# Patient Record
Sex: FEMALE | Race: WHITE | NOT HISPANIC OR LATINO
[De-identification: names, ages, dates, MRNs, and addresses within clinical notes are randomized per-mention and may not be internally consistent; named-entity substitution may affect disease eponyms.]

---

## 2017-01-09 ENCOUNTER — RX RENEWAL (OUTPATIENT)
Age: 47
End: 2017-01-09

## 2017-02-17 ENCOUNTER — RX RENEWAL (OUTPATIENT)
Age: 47
End: 2017-02-17

## 2017-02-22 ENCOUNTER — RX RENEWAL (OUTPATIENT)
Age: 47
End: 2017-02-22

## 2017-02-22 ENCOUNTER — APPOINTMENT (OUTPATIENT)
Dept: OBGYN | Facility: CLINIC | Age: 47
End: 2017-02-22

## 2017-02-24 ENCOUNTER — APPOINTMENT (OUTPATIENT)
Dept: DERMATOLOGY | Facility: CLINIC | Age: 47
End: 2017-02-24

## 2017-02-24 VITALS
HEIGHT: 63 IN | DIASTOLIC BLOOD PRESSURE: 70 MMHG | BODY MASS INDEX: 43.41 KG/M2 | SYSTOLIC BLOOD PRESSURE: 128 MMHG | WEIGHT: 245 LBS

## 2017-02-24 DIAGNOSIS — Z86.69 PERSONAL HISTORY OF OTHER DISEASES OF THE NERVOUS SYSTEM AND SENSE ORGANS: ICD-10-CM

## 2017-02-24 DIAGNOSIS — Z12.83 ENCOUNTER FOR SCREENING FOR MALIGNANT NEOPLASM OF SKIN: ICD-10-CM

## 2017-02-24 DIAGNOSIS — I78.1 NEVUS, NON-NEOPLASTIC: ICD-10-CM

## 2017-02-24 DIAGNOSIS — L81.4 OTHER MELANIN HYPERPIGMENTATION: ICD-10-CM

## 2017-03-01 ENCOUNTER — APPOINTMENT (OUTPATIENT)
Dept: INTERNAL MEDICINE | Facility: CLINIC | Age: 47
End: 2017-03-01

## 2017-03-01 VITALS — SYSTOLIC BLOOD PRESSURE: 126 MMHG | DIASTOLIC BLOOD PRESSURE: 84 MMHG

## 2017-03-01 VITALS
SYSTOLIC BLOOD PRESSURE: 139 MMHG | TEMPERATURE: 98.5 F | HEART RATE: 75 BPM | DIASTOLIC BLOOD PRESSURE: 87 MMHG | RESPIRATION RATE: 12 BRPM | BODY MASS INDEX: 43.41 KG/M2 | HEIGHT: 63 IN | WEIGHT: 245 LBS | OXYGEN SATURATION: 99 %

## 2017-03-08 LAB
ADJUSTED MITOGEN: >10 IU/ML
ADJUSTED TB AG: 0 IU/ML
ALBUMIN SERPL ELPH-MCNC: 4.4 G/DL
ALP BLD-CCNC: 52 U/L
ALT SERPL-CCNC: 29 U/L
ANION GAP SERPL CALC-SCNC: 18 MMOL/L
APPEARANCE: CLEAR
AST SERPL-CCNC: 22 U/L
BASOPHILS # BLD AUTO: 0.01 K/UL
BASOPHILS NFR BLD AUTO: 0.1 %
BILIRUB SERPL-MCNC: 0.7 MG/DL
BILIRUBIN URINE: NEGATIVE
BLOOD URINE: NEGATIVE
BUN SERPL-MCNC: 13 MG/DL
CALCIUM SERPL-MCNC: 9.7 MG/DL
CHLORIDE SERPL-SCNC: 99 MMOL/L
CHOLEST SERPL-MCNC: 242 MG/DL
CHOLEST/HDLC SERPL: 4.7 RATIO
CO2 SERPL-SCNC: 23 MMOL/L
COLOR: YELLOW
CREAT SERPL-MCNC: 0.73 MG/DL
EOSINOPHIL # BLD AUTO: 0.12 K/UL
EOSINOPHIL NFR BLD AUTO: 1.4 %
GLUCOSE QUALITATIVE U: NORMAL MG/DL
GLUCOSE SERPL-MCNC: 86 MG/DL
HBA1C MFR BLD HPLC: 5.2 %
HBV SURFACE AB SER QL: NONREACTIVE
HCT VFR BLD CALC: 41.5 %
HDLC SERPL-MCNC: 52 MG/DL
HGB BLD-MCNC: 14.1 G/DL
IMM GRANULOCYTES NFR BLD AUTO: 0.2 %
KETONES URINE: NEGATIVE
LDLC SERPL CALC-MCNC: 156 MG/DL
LEUKOCYTE ESTERASE URINE: NEGATIVE
LYMPHOCYTES # BLD AUTO: 2.39 K/UL
LYMPHOCYTES NFR BLD AUTO: 26.9 %
M TB IFN-G BLD-IMP: NEGATIVE
MAN DIFF?: NORMAL
MCHC RBC-ENTMCNC: 28.7 PG
MCHC RBC-ENTMCNC: 34 GM/DL
MCV RBC AUTO: 84.3 FL
MEV IGG FLD QL IA: 258 AU/ML
MEV IGG+IGM SER-IMP: POSITIVE
MONOCYTES # BLD AUTO: 0.44 K/UL
MONOCYTES NFR BLD AUTO: 5 %
NEUTROPHILS # BLD AUTO: 5.89 K/UL
NEUTROPHILS NFR BLD AUTO: 66.4 %
NITRITE URINE: NEGATIVE
PH URINE: 6
PLATELET # BLD AUTO: 317 K/UL
POTASSIUM SERPL-SCNC: 3.9 MMOL/L
PROT SERPL-MCNC: 7.5 G/DL
PROTEIN URINE: NEGATIVE MG/DL
QUANTIFERON GOLD NIL: 0.03 IU/ML
RBC # BLD: 4.92 M/UL
RBC # FLD: 14.2 %
RUBV IGG FLD-ACNC: 3.2 INDEX
RUBV IGG SER-IMP: POSITIVE
SODIUM SERPL-SCNC: 140 MMOL/L
SPECIFIC GRAVITY URINE: 1.02
TRIGL SERPL-MCNC: 169 MG/DL
TSH SERPL-ACNC: 1.25 UIU/ML
UROBILINOGEN URINE: NORMAL MG/DL
VIT B12 SERPL-MCNC: 582 PG/ML
VZV AB TITR SER: POSITIVE
VZV IGG SER IF-ACNC: 463.8 INDEX
WBC # FLD AUTO: 8.87 K/UL

## 2017-03-09 LAB — 25(OH)D3 SERPL-MCNC: 25.8 NG/ML

## 2017-03-16 ENCOUNTER — RX RENEWAL (OUTPATIENT)
Age: 47
End: 2017-03-16

## 2017-05-01 ENCOUNTER — RX RENEWAL (OUTPATIENT)
Age: 47
End: 2017-05-01

## 2017-05-25 ENCOUNTER — APPOINTMENT (OUTPATIENT)
Dept: ORTHOPEDIC SURGERY | Facility: CLINIC | Age: 47
End: 2017-05-25

## 2017-05-25 VITALS
SYSTOLIC BLOOD PRESSURE: 140 MMHG | BODY MASS INDEX: 43.41 KG/M2 | DIASTOLIC BLOOD PRESSURE: 87 MMHG | WEIGHT: 245 LBS | HEIGHT: 63 IN

## 2017-05-25 DIAGNOSIS — M47.816 SPONDYLOSIS W/OUT MYELOPATHY OR RADICULOPATHY, LUMBAR REGION: ICD-10-CM

## 2017-08-25 ENCOUNTER — RX RENEWAL (OUTPATIENT)
Age: 47
End: 2017-08-25

## 2017-09-18 ENCOUNTER — APPOINTMENT (OUTPATIENT)
Dept: INTERNAL MEDICINE | Facility: CLINIC | Age: 47
End: 2017-09-18
Payer: MEDICAID

## 2017-09-18 VITALS
HEIGHT: 63 IN | HEART RATE: 73 BPM | WEIGHT: 215 LBS | SYSTOLIC BLOOD PRESSURE: 104 MMHG | RESPIRATION RATE: 12 BRPM | BODY MASS INDEX: 38.09 KG/M2 | OXYGEN SATURATION: 98 % | TEMPERATURE: 98.1 F | DIASTOLIC BLOOD PRESSURE: 70 MMHG

## 2017-09-18 DIAGNOSIS — L65.9 NONSCARRING HAIR LOSS, UNSPECIFIED: ICD-10-CM

## 2017-09-18 PROCEDURE — 99214 OFFICE O/P EST MOD 30 MIN: CPT

## 2017-09-20 LAB — TSH SERPL-ACNC: 1.85 UIU/ML

## 2017-09-22 LAB
ANA PAT FLD IF-IMP: ABNORMAL
ANA SER IF-ACNC: ABNORMAL
ESTROGEN SERPL-MCNC: 97 PG/ML

## 2017-09-24 LAB
TESTOST BND SERPL-MCNC: 0.4 PG/ML
TESTOST SERPL-MCNC: 22.4 NG/DL

## 2017-09-25 DIAGNOSIS — R76.8 OTHER SPECIFIED ABNORMAL IMMUNOLOGICAL FINDINGS IN SERUM: ICD-10-CM

## 2017-09-27 PROBLEM — R76.8 ELEVATED ANTINUCLEAR ANTIBODY (ANA) LEVEL: Status: ACTIVE | Noted: 2017-09-27

## 2017-09-27 LAB
BASOPHILS # BLD AUTO: 0.02 K/UL
BASOPHILS NFR BLD AUTO: 0.2 %
EOSINOPHIL # BLD AUTO: 0.1 K/UL
EOSINOPHIL NFR BLD AUTO: 1.2 %
HCT VFR BLD CALC: 41.8 %
HGB BLD-MCNC: 13.6 G/DL
IMM GRANULOCYTES NFR BLD AUTO: 0.2 %
LYMPHOCYTES # BLD AUTO: 2.43 K/UL
LYMPHOCYTES NFR BLD AUTO: 28 %
MAN DIFF?: NORMAL
MCHC RBC-ENTMCNC: 29.2 PG
MCHC RBC-ENTMCNC: 32.5 GM/DL
MCV RBC AUTO: 89.7 FL
MONOCYTES # BLD AUTO: 0.55 K/UL
MONOCYTES NFR BLD AUTO: 6.3 %
NEUTROPHILS # BLD AUTO: 5.57 K/UL
NEUTROPHILS NFR BLD AUTO: 64.1 %
PLATELET # BLD AUTO: 285 K/UL
RBC # BLD: 4.66 M/UL
RBC # FLD: 14.2 %
WBC # FLD AUTO: 8.69 K/UL

## 2017-10-10 ENCOUNTER — TRANSCRIPTION ENCOUNTER (OUTPATIENT)
Age: 47
End: 2017-10-10

## 2017-10-30 ENCOUNTER — APPOINTMENT (OUTPATIENT)
Dept: INTERNAL MEDICINE | Facility: CLINIC | Age: 47
End: 2017-10-30

## 2017-11-02 ENCOUNTER — APPOINTMENT (OUTPATIENT)
Dept: RHEUMATOLOGY | Facility: CLINIC | Age: 47
End: 2017-11-02

## 2017-11-09 ENCOUNTER — APPOINTMENT (OUTPATIENT)
Dept: DERMATOLOGY | Facility: CLINIC | Age: 47
End: 2017-11-09

## 2018-03-09 ENCOUNTER — RX RENEWAL (OUTPATIENT)
Age: 48
End: 2018-03-09

## 2018-03-19 ENCOUNTER — APPOINTMENT (OUTPATIENT)
Dept: OBGYN | Facility: CLINIC | Age: 48
End: 2018-03-19
Payer: COMMERCIAL

## 2018-03-19 VITALS
WEIGHT: 207.6 LBS | BODY MASS INDEX: 36.79 KG/M2 | HEART RATE: 61 BPM | HEIGHT: 63 IN | SYSTOLIC BLOOD PRESSURE: 133 MMHG | DIASTOLIC BLOOD PRESSURE: 86 MMHG

## 2018-03-19 PROCEDURE — 99386 PREV VISIT NEW AGE 40-64: CPT

## 2018-03-20 LAB — HPV HIGH+LOW RISK DNA PNL CVX: NOT DETECTED

## 2018-03-26 ENCOUNTER — MEDICATION RENEWAL (OUTPATIENT)
Age: 48
End: 2018-03-26

## 2018-03-27 ENCOUNTER — MEDICATION RENEWAL (OUTPATIENT)
Age: 48
End: 2018-03-27

## 2018-04-01 LAB — CYTOLOGY CVX/VAG DOC THIN PREP: NORMAL

## 2018-05-18 ENCOUNTER — RX RENEWAL (OUTPATIENT)
Age: 48
End: 2018-05-18

## 2018-06-18 ENCOUNTER — APPOINTMENT (OUTPATIENT)
Dept: INTERNAL MEDICINE | Facility: CLINIC | Age: 48
End: 2018-06-18

## 2018-06-27 ENCOUNTER — RX RENEWAL (OUTPATIENT)
Age: 48
End: 2018-06-27

## 2018-08-30 ENCOUNTER — RX RENEWAL (OUTPATIENT)
Age: 48
End: 2018-08-30

## 2018-09-11 ENCOUNTER — APPOINTMENT (OUTPATIENT)
Dept: INTERNAL MEDICINE | Facility: CLINIC | Age: 48
End: 2018-09-11

## 2018-10-01 ENCOUNTER — APPOINTMENT (OUTPATIENT)
Dept: INTERNAL MEDICINE | Facility: CLINIC | Age: 48
End: 2018-10-01
Payer: COMMERCIAL

## 2018-10-01 ENCOUNTER — LABORATORY RESULT (OUTPATIENT)
Age: 48
End: 2018-10-01

## 2018-10-01 VITALS
BODY MASS INDEX: 36.32 KG/M2 | WEIGHT: 205 LBS | SYSTOLIC BLOOD PRESSURE: 137 MMHG | RESPIRATION RATE: 12 BRPM | OXYGEN SATURATION: 98 % | HEART RATE: 64 BPM | TEMPERATURE: 98.1 F | DIASTOLIC BLOOD PRESSURE: 82 MMHG | HEIGHT: 63 IN

## 2018-10-01 PROCEDURE — 99396 PREV VISIT EST AGE 40-64: CPT

## 2018-10-01 NOTE — HEALTH RISK ASSESSMENT
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

## 2018-10-01 NOTE — ASSESSMENT
[FreeTextEntry1] : Physical\par blood work ordered\par Had flu vaccine at work\par referred for mammography\par She is UTD with her PAP\par \par Hx of  HTN \par cont current meds\par \par Hx of hypercholesterolemia\par cont low chol/low fat diet\par \par f/u in one week for lab results\par

## 2018-10-02 LAB
25(OH)D3 SERPL-MCNC: 30.1 NG/ML
ALBUMIN SERPL ELPH-MCNC: 4.4 G/DL
ALP BLD-CCNC: 41 U/L
ALT SERPL-CCNC: 15 U/L
ANION GAP SERPL CALC-SCNC: 16 MMOL/L
AST SERPL-CCNC: 17 U/L
BASOPHILS # BLD AUTO: 0.02 K/UL
BASOPHILS NFR BLD AUTO: 0.2 %
BILIRUB SERPL-MCNC: 0.5 MG/DL
BUN SERPL-MCNC: 17 MG/DL
CALCIUM SERPL-MCNC: 9.7 MG/DL
CHLORIDE SERPL-SCNC: 102 MMOL/L
CHOLEST SERPL-MCNC: 200 MG/DL
CHOLEST/HDLC SERPL: 3.3 RATIO
CO2 SERPL-SCNC: 24 MMOL/L
CREAT SERPL-MCNC: 0.76 MG/DL
EOSINOPHIL # BLD AUTO: 0.16 K/UL
EOSINOPHIL NFR BLD AUTO: 2 %
FERRITIN SERPL-MCNC: 125 NG/ML
GLUCOSE SERPL-MCNC: 82 MG/DL
HBA1C MFR BLD HPLC: 5 %
HCT VFR BLD CALC: 42.9 %
HDLC SERPL-MCNC: 60 MG/DL
HGB BLD-MCNC: 14.6 G/DL
IMM GRANULOCYTES NFR BLD AUTO: 0.1 %
IRON SATN MFR SERPL: 29 %
IRON SERPL-MCNC: 117 UG/DL
LDLC SERPL CALC-MCNC: 108 MG/DL
LYMPHOCYTES # BLD AUTO: 2.67 K/UL
LYMPHOCYTES NFR BLD AUTO: 32.8 %
MAN DIFF?: NORMAL
MCHC RBC-ENTMCNC: 30.4 PG
MCHC RBC-ENTMCNC: 34 GM/DL
MCV RBC AUTO: 89.4 FL
MONOCYTES # BLD AUTO: 0.49 K/UL
MONOCYTES NFR BLD AUTO: 6 %
NEUTROPHILS # BLD AUTO: 4.8 K/UL
NEUTROPHILS NFR BLD AUTO: 58.9 %
PLATELET # BLD AUTO: 224 K/UL
POTASSIUM SERPL-SCNC: 4.4 MMOL/L
PROT SERPL-MCNC: 7.4 G/DL
RBC # BLD: 4.8 M/UL
RBC # FLD: 13.7 %
SODIUM SERPL-SCNC: 141 MMOL/L
TIBC SERPL-MCNC: 398 UG/DL
TRIGL SERPL-MCNC: 159 MG/DL
TSH SERPL-ACNC: 1.42 UIU/ML
UIBC SERPL-MCNC: 281 UG/DL
VIT B12 SERPL-MCNC: 1004 PG/ML
WBC # FLD AUTO: 8.15 K/UL

## 2018-10-10 LAB
APPEARANCE: CLEAR
BILIRUBIN URINE: NEGATIVE
BLOOD URINE: NEGATIVE
COLOR: YELLOW
GLUCOSE QUALITATIVE U: NEGATIVE MG/DL
KETONES URINE: NEGATIVE
LEUKOCYTE ESTERASE URINE: ABNORMAL
NITRITE URINE: NEGATIVE
PH URINE: 5
PROTEIN URINE: NEGATIVE MG/DL
SPECIFIC GRAVITY URINE: 1.02
UROBILINOGEN URINE: NEGATIVE MG/DL

## 2018-10-25 LAB — BACTERIA UR CULT: NORMAL

## 2018-10-29 ENCOUNTER — MEDICATION RENEWAL (OUTPATIENT)
Age: 48
End: 2018-10-29

## 2018-11-01 LAB
APPEARANCE: CLEAR
BACTERIA: ABNORMAL
BILIRUBIN URINE: NEGATIVE
BLOOD URINE: NEGATIVE
COLOR: YELLOW
GLUCOSE QUALITATIVE U: NEGATIVE MG/DL
HYALINE CASTS: 3 /LPF
KETONES URINE: NEGATIVE
LEUKOCYTE ESTERASE URINE: ABNORMAL
MICROSCOPIC-UA: NORMAL
NITRITE URINE: NEGATIVE
PH URINE: 5.5
PROTEIN URINE: NEGATIVE MG/DL
RED BLOOD CELLS URINE: 8 /HPF
SPECIFIC GRAVITY URINE: 1.03
SQUAMOUS EPITHELIAL CELLS: 8 /HPF
UROBILINOGEN URINE: NEGATIVE MG/DL
WHITE BLOOD CELLS URINE: 13 /HPF

## 2018-11-05 ENCOUNTER — APPOINTMENT (OUTPATIENT)
Dept: UROLOGY | Facility: CLINIC | Age: 48
End: 2018-11-05
Payer: COMMERCIAL

## 2018-12-10 ENCOUNTER — APPOINTMENT (OUTPATIENT)
Dept: UROLOGY | Facility: CLINIC | Age: 48
End: 2018-12-10
Payer: COMMERCIAL

## 2018-12-10 VITALS
DIASTOLIC BLOOD PRESSURE: 84 MMHG | HEIGHT: 63 IN | RESPIRATION RATE: 12 BRPM | WEIGHT: 208 LBS | SYSTOLIC BLOOD PRESSURE: 127 MMHG | OXYGEN SATURATION: 98 % | BODY MASS INDEX: 36.86 KG/M2 | HEART RATE: 69 BPM

## 2018-12-10 DIAGNOSIS — I34.1 NONRHEUMATIC MITRAL (VALVE) PROLAPSE: ICD-10-CM

## 2018-12-10 PROCEDURE — 99204 OFFICE O/P NEW MOD 45 MIN: CPT

## 2019-01-16 ENCOUNTER — APPOINTMENT (OUTPATIENT)
Dept: UROLOGY | Facility: CLINIC | Age: 49
End: 2019-01-16

## 2019-01-17 ENCOUNTER — OUTPATIENT (OUTPATIENT)
Dept: OUTPATIENT SERVICES | Facility: HOSPITAL | Age: 49
LOS: 1 days | End: 2019-01-17
Payer: COMMERCIAL

## 2019-01-17 ENCOUNTER — APPOINTMENT (OUTPATIENT)
Dept: UROLOGY | Facility: CLINIC | Age: 49
End: 2019-01-17
Payer: COMMERCIAL

## 2019-01-17 VITALS
TEMPERATURE: 98 F | RESPIRATION RATE: 16 BRPM | HEART RATE: 72 BPM | SYSTOLIC BLOOD PRESSURE: 119 MMHG | DIASTOLIC BLOOD PRESSURE: 78 MMHG

## 2019-01-17 DIAGNOSIS — R82.90 UNSPECIFIED ABNORMAL FINDINGS IN URINE: ICD-10-CM

## 2019-01-17 PROCEDURE — 52000 CYSTOURETHROSCOPY: CPT

## 2019-01-18 ENCOUNTER — APPOINTMENT (OUTPATIENT)
Dept: UROLOGY | Facility: CLINIC | Age: 49
End: 2019-01-18

## 2019-01-18 DIAGNOSIS — R31.21 ASYMPTOMATIC MICROSCOPIC HEMATURIA: ICD-10-CM

## 2019-01-18 DIAGNOSIS — R82.90 UNSPECIFIED ABNORMAL FINDINGS IN URINE: ICD-10-CM

## 2019-02-25 ENCOUNTER — APPOINTMENT (OUTPATIENT)
Dept: INTERNAL MEDICINE | Facility: CLINIC | Age: 49
End: 2019-02-25

## 2019-03-04 ENCOUNTER — APPOINTMENT (OUTPATIENT)
Dept: OPHTHALMOLOGY | Facility: CLINIC | Age: 49
End: 2019-03-04
Payer: COMMERCIAL

## 2019-03-04 PROCEDURE — 92004 COMPRE OPH EXAM NEW PT 1/>: CPT

## 2019-03-04 PROCEDURE — 92133 CPTRZD OPH DX IMG PST SGM ON: CPT

## 2019-03-18 ENCOUNTER — APPOINTMENT (OUTPATIENT)
Dept: INTERNAL MEDICINE | Facility: CLINIC | Age: 49
End: 2019-03-18

## 2019-03-20 ENCOUNTER — APPOINTMENT (OUTPATIENT)
Dept: INTERNAL MEDICINE | Facility: CLINIC | Age: 49
End: 2019-03-20
Payer: COMMERCIAL

## 2019-03-20 VITALS
WEIGHT: 214 LBS | BODY MASS INDEX: 37.92 KG/M2 | HEIGHT: 63 IN | RESPIRATION RATE: 12 BRPM | TEMPERATURE: 98.4 F | OXYGEN SATURATION: 97 % | HEART RATE: 69 BPM | DIASTOLIC BLOOD PRESSURE: 83 MMHG | SYSTOLIC BLOOD PRESSURE: 119 MMHG

## 2019-03-20 DIAGNOSIS — M62.838 OTHER MUSCLE SPASM: ICD-10-CM

## 2019-03-20 DIAGNOSIS — S16.1XXA STRAIN OF MUSCLE, FASCIA AND TENDON AT NECK LEVEL, INITIAL ENCOUNTER: ICD-10-CM

## 2019-03-20 PROCEDURE — 99214 OFFICE O/P EST MOD 30 MIN: CPT

## 2019-03-20 NOTE — HISTORY OF PRESENT ILLNESS
[FreeTextEntry8] : 47 yo female, presents complaining of pain on the right side of her neck upper shoulder area. Pain has been on and off for a couple of months. Yesterday while at work she got the pain in the morning and as the day progressed the pain got worse and she could not move her neck. She took ibuprofen and  put a heating pad but it did not help. Pain goes to the right shoulder. denies numbness/tingling to the upper extremities. This morning she says the pain is better and she is able to move her head

## 2019-03-20 NOTE — PHYSICAL EXAM
[No Acute Distress] : no acute distress [Well Nourished] : well nourished [Well Developed] : well developed [Well-Appearing] : well-appearing [Normal Sclera/Conjunctiva] : normal sclera/conjunctiva [Normal Outer Ear/Nose] : the outer ears and nose were normal in appearance [No JVD] : no jugular venous distention [Clear to Auscultation] : lungs were clear to auscultation bilaterally [No Respiratory Distress] : no respiratory distress  [Regular Rhythm] : with a regular rhythm [Normal Rate] : normal rate  [Normal S1, S2] : normal S1 and S2 [No Edema] : there was no peripheral edema [Non-distended] : non-distended [Non Tender] : non-tender [Soft] : abdomen soft [No CVA Tenderness] : no CVA  tenderness [No Rash] : no rash [No Focal Deficits] : no focal deficits [Normal Gait] : normal gait [Normal Insight/Judgement] : insight and judgment were intact [de-identified] : + mild rt paracervical and rt upper trapezius muscle tenderness and spasm. ROM are essentially within normalits at the C-spine

## 2019-03-25 ENCOUNTER — APPOINTMENT (OUTPATIENT)
Dept: OPHTHALMOLOGY | Facility: CLINIC | Age: 49
End: 2019-03-25
Payer: COMMERCIAL

## 2019-03-25 PROCEDURE — 92082 INTERMEDIATE VISUAL FIELD XM: CPT

## 2019-03-25 PROCEDURE — 92012 INTRM OPH EXAM EST PATIENT: CPT

## 2019-07-15 ENCOUNTER — APPOINTMENT (OUTPATIENT)
Dept: UROLOGY | Facility: CLINIC | Age: 49
End: 2019-07-15

## 2019-09-04 RX ORDER — SUMATRIPTAN AND NAPROXEN SODIUM 85; 500 MG/1; MG/1
85-500 TABLET, FILM COATED ORAL
Qty: 30 | Refills: 0 | Status: DISCONTINUED | COMMUNITY
Start: 2018-06-27 | End: 2019-09-04

## 2019-09-30 ENCOUNTER — APPOINTMENT (OUTPATIENT)
Dept: OPHTHALMOLOGY | Facility: CLINIC | Age: 49
End: 2019-09-30

## 2019-11-03 ENCOUNTER — TRANSCRIPTION ENCOUNTER (OUTPATIENT)
Age: 49
End: 2019-11-03

## 2020-01-22 ENCOUNTER — APPOINTMENT (OUTPATIENT)
Dept: INTERNAL MEDICINE | Facility: CLINIC | Age: 50
End: 2020-01-22
Payer: MEDICAID

## 2020-01-22 VITALS
DIASTOLIC BLOOD PRESSURE: 87 MMHG | HEART RATE: 66 BPM | WEIGHT: 235 LBS | SYSTOLIC BLOOD PRESSURE: 132 MMHG | RESPIRATION RATE: 13 BRPM | HEIGHT: 63 IN | TEMPERATURE: 98.7 F | BODY MASS INDEX: 41.64 KG/M2 | OXYGEN SATURATION: 96 %

## 2020-01-22 PROCEDURE — 99396 PREV VISIT EST AGE 40-64: CPT | Mod: 25

## 2020-01-22 PROCEDURE — 99213 OFFICE O/P EST LOW 20 MIN: CPT

## 2020-01-22 NOTE — ASSESSMENT
[FreeTextEntry1] : Physical\par Has linda't for colonoscopy next month ( Feb 14th, 2020)\par referred for mammography \par pt to schedule linda't with her gyn\par Had flu vaccine at work\par blood work ordered\par \par  Hx of HTN, hypercholesterolemia\par cont current meds\par \par dependent edema\par referred to vascular\par \par follow up in one week for lab results\par

## 2020-01-22 NOTE — PHYSICAL EXAM
[No Acute Distress] : no acute distress [Well-Appearing] : well-appearing [Normal Sclera/Conjunctiva] : normal sclera/conjunctiva [PERRL] : pupils equal round and reactive to light [EOMI] : extraocular movements intact [Normal Outer Ear/Nose] : the outer ears and nose were normal in appearance [Normal Oropharynx] : the oropharynx was normal [No JVD] : no jugular venous distention [No Lymphadenopathy] : no lymphadenopathy [Thyroid Normal, No Nodules] : the thyroid was normal and there were no nodules present [Supple] : supple [No Respiratory Distress] : no respiratory distress  [No Accessory Muscle Use] : no accessory muscle use [Normal Rate] : normal rate  [Clear to Auscultation] : lungs were clear to auscultation bilaterally [Regular Rhythm] : with a regular rhythm [Normal S1, S2] : normal S1 and S2 [No Murmur] : no murmur heard [Soft] : abdomen soft [No Extremity Clubbing/Cyanosis] : no extremity clubbing/cyanosis [Non-distended] : non-distended [Non Tender] : non-tender [No Masses] : no abdominal mass palpated [No HSM] : no HSM [Normal Anterior Cervical Nodes] : no anterior cervical lymphadenopathy [Normal Bowel Sounds] : normal bowel sounds [Normal Posterior Cervical Nodes] : no posterior cervical lymphadenopathy [No Spinal Tenderness] : no spinal tenderness [No CVA Tenderness] : no CVA  tenderness [No Joint Swelling] : no joint swelling [Grossly Normal Strength/Tone] : grossly normal strength/tone [No Rash] : no rash [No Focal Deficits] : no focal deficits [Coordination Grossly Intact] : coordination grossly intact [Normal Gait] : normal gait [Normal Affect] : the affect was normal [Alert and Oriented x3] : oriented to person, place, and time [Normal Insight/Judgement] : insight and judgment were intact [de-identified] : obese female [de-identified] : + dependent edema b/l LE with early venous stasis skin changes noted

## 2020-01-22 NOTE — COUNSELING
[Benefits of weight loss discussed] : Benefits of weight loss discussed [Encouraged to increase physical activity] : Encouraged to increase physical activity [FreeTextEntry2] : pt states she has started weight watchers

## 2020-01-22 NOTE — HISTORY OF PRESENT ILLNESS
[de-identified] : Ms. KAMARI PASTOR is a 49 year old female, with Hx of HTN, hypercholesterolemia, presents for annual physical\par She doing well, offers no complaints\par

## 2020-01-22 NOTE — HEALTH RISK ASSESSMENT
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

## 2020-01-30 ENCOUNTER — APPOINTMENT (OUTPATIENT)
Dept: OPHTHALMOLOGY | Facility: CLINIC | Age: 50
End: 2020-01-30
Payer: COMMERCIAL

## 2020-01-30 ENCOUNTER — NON-APPOINTMENT (OUTPATIENT)
Age: 50
End: 2020-01-30

## 2020-01-30 LAB
25(OH)D3 SERPL-MCNC: 25.8 NG/ML
ALBUMIN SERPL ELPH-MCNC: 4.7 G/DL
ALP BLD-CCNC: 51 U/L
ALT SERPL-CCNC: 19 U/L
ANION GAP SERPL CALC-SCNC: 17 MMOL/L
APPEARANCE: CLEAR
AST SERPL-CCNC: 20 U/L
BASOPHILS # BLD AUTO: 0.03 K/UL
BASOPHILS NFR BLD AUTO: 0.5 %
BILIRUB SERPL-MCNC: 0.6 MG/DL
BILIRUBIN URINE: NEGATIVE
BLOOD URINE: NEGATIVE
BUN SERPL-MCNC: 14 MG/DL
CALCIUM SERPL-MCNC: 10 MG/DL
CHLORIDE SERPL-SCNC: 98 MMOL/L
CHOLEST SERPL-MCNC: 246 MG/DL
CHOLEST/HDLC SERPL: 3.7 RATIO
CO2 SERPL-SCNC: 25 MMOL/L
COLOR: NORMAL
CREAT SERPL-MCNC: 0.65 MG/DL
EOSINOPHIL # BLD AUTO: 0.12 K/UL
EOSINOPHIL NFR BLD AUTO: 1.9 %
ESTIMATED AVERAGE GLUCOSE: 88 MG/DL
GLUCOSE QUALITATIVE U: NEGATIVE
GLUCOSE SERPL-MCNC: 75 MG/DL
HBA1C MFR BLD HPLC: 4.7 %
HCT VFR BLD CALC: 45.8 %
HDLC SERPL-MCNC: 66 MG/DL
HGB BLD-MCNC: 14.9 G/DL
IMM GRANULOCYTES NFR BLD AUTO: 0.2 %
KETONES URINE: NEGATIVE
LDLC SERPL CALC-MCNC: 150 MG/DL
LEUKOCYTE ESTERASE URINE: NEGATIVE
LYMPHOCYTES # BLD AUTO: 2.27 K/UL
LYMPHOCYTES NFR BLD AUTO: 36.8 %
MAN DIFF?: NORMAL
MCHC RBC-ENTMCNC: 29.4 PG
MCHC RBC-ENTMCNC: 32.5 GM/DL
MCV RBC AUTO: 90.3 FL
MONOCYTES # BLD AUTO: 0.4 K/UL
MONOCYTES NFR BLD AUTO: 6.5 %
NEUTROPHILS # BLD AUTO: 3.34 K/UL
NEUTROPHILS NFR BLD AUTO: 54.1 %
NITRITE URINE: NEGATIVE
PH URINE: 6.5
PLATELET # BLD AUTO: 250 K/UL
POTASSIUM SERPL-SCNC: 4.1 MMOL/L
PROT SERPL-MCNC: 7.5 G/DL
PROTEIN URINE: NEGATIVE
RBC # BLD: 5.07 M/UL
RBC # FLD: 13.9 %
SODIUM SERPL-SCNC: 140 MMOL/L
SPECIFIC GRAVITY URINE: 1.01
TRIGL SERPL-MCNC: 148 MG/DL
TSH SERPL-ACNC: 1.34 UIU/ML
UROBILINOGEN URINE: NORMAL
VIT B12 SERPL-MCNC: 555 PG/ML
WBC # FLD AUTO: 6.17 K/UL

## 2020-01-30 PROCEDURE — 92134 CPTRZ OPH DX IMG PST SGM RTA: CPT

## 2020-01-30 PROCEDURE — 92014 COMPRE OPH EXAM EST PT 1/>: CPT

## 2020-02-21 ENCOUNTER — APPOINTMENT (OUTPATIENT)
Dept: CARDIOLOGY | Facility: CLINIC | Age: 50
End: 2020-02-21

## 2020-02-27 ENCOUNTER — RX RENEWAL (OUTPATIENT)
Age: 50
End: 2020-02-27

## 2020-03-09 ENCOUNTER — APPOINTMENT (OUTPATIENT)
Dept: SURGERY | Facility: CLINIC | Age: 50
End: 2020-03-09
Payer: COMMERCIAL

## 2020-03-09 VITALS
SYSTOLIC BLOOD PRESSURE: 145 MMHG | WEIGHT: 234 LBS | RESPIRATION RATE: 16 BRPM | BODY MASS INDEX: 43.06 KG/M2 | TEMPERATURE: 97.9 F | OXYGEN SATURATION: 98 % | HEIGHT: 62 IN | HEART RATE: 73 BPM | DIASTOLIC BLOOD PRESSURE: 86 MMHG

## 2020-03-09 DIAGNOSIS — Z56.0 UNEMPLOYMENT, UNSPECIFIED: ICD-10-CM

## 2020-03-09 PROCEDURE — 99204 OFFICE O/P NEW MOD 45 MIN: CPT

## 2020-03-09 SDOH — ECONOMIC STABILITY - INCOME SECURITY: UNEMPLOYMENT, UNSPECIFIED: Z56.0

## 2020-03-09 NOTE — PHYSICAL EXAM
[Obese, well nourished, in no acute distress] : obese, well nourished, in no acute distress [Normal] : affect appropriate [de-identified] : normal respirations and chest expansion  [de-identified] : soft, nontender, nondistended

## 2020-03-09 NOTE — HISTORY OF PRESENT ILLNESS
[de-identified] :  KAMARI PASTOR is a 49 year old female here for consultation for weight loss surgery. \par \par The patient has been struggling with obesity for her whole life.  She has attempted several diet and exercise programs without prolonged success.  She was most recently on weight watchers, losing over 50 pounds, however has started to regain the weight.  The excess weight is starting to significantly affect her health and lifestyle.\par \par Medical history is significant for hypertension and lower extremity edema.\par Surgical history is negative.\par The patient denies prior history of blood clot or abnormal bleeding.\par The patient denies prior history of dysphagia.  She has mild reflux associated with acidic foods.\par \par

## 2020-03-09 NOTE — ASSESSMENT
[FreeTextEntry1] : 49-year-old female with morbid obesity (height 5 feet 2 inches, weight 234 pounds, BMI 43 kg/m²) and comorbidity of hypertension who is interested in surgical weight loss.\par \par The patient has failed multiple prior attempts at weight loss and is now dealing with the side effects, risk factors, and poor quality of life associated with morbid obesity.  They would benefit from surgical weight loss as outlined in the NIH and  ASMBS consensus statements on bariatric surgery.  Surgical intervention is medically indicated.\par \par My impression is that the patient is a reasonable candidate for laparoscopic sleeve gastrectomy or gastric bypass.  I feel that given her dietary habits, gastric bypass would provide better long-term weight control.  She is currently deciding between sleeve gastrectomy versus gastric bypass.\par

## 2020-04-17 ENCOUNTER — APPOINTMENT (OUTPATIENT)
Dept: SURGERY | Facility: CLINIC | Age: 50
End: 2020-04-17

## 2020-06-12 ENCOUNTER — APPOINTMENT (OUTPATIENT)
Dept: OBGYN | Facility: CLINIC | Age: 50
End: 2020-06-12

## 2020-06-18 ENCOUNTER — APPOINTMENT (OUTPATIENT)
Dept: CARDIOLOGY | Facility: CLINIC | Age: 50
End: 2020-06-18

## 2020-06-29 ENCOUNTER — APPOINTMENT (OUTPATIENT)
Dept: VASCULAR SURGERY | Facility: CLINIC | Age: 50
End: 2020-06-29
Payer: COMMERCIAL

## 2020-06-29 VITALS
DIASTOLIC BLOOD PRESSURE: 53 MMHG | TEMPERATURE: 98.1 F | SYSTOLIC BLOOD PRESSURE: 73 MMHG | WEIGHT: 240 LBS | HEIGHT: 62 IN | BODY MASS INDEX: 44.16 KG/M2 | HEART RATE: 132 BPM

## 2020-06-29 PROCEDURE — 99203 OFFICE O/P NEW LOW 30 MIN: CPT

## 2020-06-29 PROCEDURE — 93970 EXTREMITY STUDY: CPT

## 2020-06-29 NOTE — PHYSICAL EXAM
[de-identified] : On physical examination the patient is NAD. Neurologically intact. The lungs are clear and the heart has a regular rate and rhythm. The abdomen is benign. Bilateral femoral and pedal pulses are palpable. Bilateral ankle edema.\par \par A lower extremity venous duplex obtained in the office today and showed:\par -No evidence of DVT/SVT/reflux in either extremity\par

## 2020-06-29 NOTE — HISTORY OF PRESENT ILLNESS
[FreeTextEntry1] : I have just had the pleasure of seeing Mrs. Milena Diaz in consultation for lower extremity edema. \par \par Mrs. Diaz is a pleasant 49-year-old lady who presents with a long history of bilateral lower extremity edema. She believes that this has recently worsened. 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 She reports that her legs feel heavy and fatigued and she has noted significant edema of the ankles. She denies any history of DVT or SVT. She denies any history of skin pigmentation changes or ulcers. She denies any history of lower extremity claudication, rest pain, or tissue loss.\par \par She denies any history of CAD, CHF, MI, arrhythmia, CVA, TIA, DM, or CRI. She is being evaluated for bariatric surgery.\par \par Her medical history is otherwise significant for obesity, HTN, GERD, MVP, HLD, and migraines\par \par Medications: Hydrochlorothiazide, Losartan\par \par Allergies: NKDA\par \par Social history: Non-smoker \par \par FH: NC\par

## 2020-06-29 NOTE — ASSESSMENT
[FreeTextEntry1] : In summary, Mrs. Diaz presents with bilateral ankle edema. I think that she would greatly benefit from weight loss. She is currently weighing options of proceeding with bariatric surgery vs continuing with an exercise/diet program. \par \par I have provided her a prescription for knee-high compression stockings and instructed her to wear these daily and remove at night. She should keep her legs elevated when possible and exercise regularly.\par \par Thank you for allowing me to participate in the care of this nice lady. Please do not hesitate to contact me with any questions. \par   \par

## 2020-07-06 ENCOUNTER — APPOINTMENT (OUTPATIENT)
Dept: INTERNAL MEDICINE | Facility: CLINIC | Age: 50
End: 2020-07-06
Payer: COMMERCIAL

## 2020-07-06 ENCOUNTER — APPOINTMENT (OUTPATIENT)
Dept: OBGYN | Facility: CLINIC | Age: 50
End: 2020-07-06
Payer: COMMERCIAL

## 2020-07-06 VITALS
TEMPERATURE: 99 F | HEART RATE: 74 BPM | RESPIRATION RATE: 14 BRPM | OXYGEN SATURATION: 97 % | DIASTOLIC BLOOD PRESSURE: 85 MMHG | BODY MASS INDEX: 45.08 KG/M2 | WEIGHT: 245 LBS | SYSTOLIC BLOOD PRESSURE: 139 MMHG | HEIGHT: 62 IN

## 2020-07-06 VITALS
DIASTOLIC BLOOD PRESSURE: 90 MMHG | HEART RATE: 71 BPM | HEIGHT: 62 IN | SYSTOLIC BLOOD PRESSURE: 139 MMHG | BODY MASS INDEX: 45.08 KG/M2 | WEIGHT: 245 LBS | TEMPERATURE: 97.8 F

## 2020-07-06 DIAGNOSIS — R60.9 EDEMA, UNSPECIFIED: ICD-10-CM

## 2020-07-06 DIAGNOSIS — R63.5 ABNORMAL WEIGHT GAIN: ICD-10-CM

## 2020-07-06 DIAGNOSIS — Z01.419 ENCOUNTER FOR GYNECOLOGICAL EXAMINATION (GENERAL) (ROUTINE) W/OUT ABNORMAL FINDINGS: ICD-10-CM

## 2020-07-06 PROCEDURE — 99396 PREV VISIT EST AGE 40-64: CPT

## 2020-07-06 PROCEDURE — 99214 OFFICE O/P EST MOD 30 MIN: CPT

## 2020-07-06 RX ORDER — NAPROXEN 500 MG/1
500 TABLET ORAL
Qty: 30 | Refills: 0 | Status: DISCONTINUED | COMMUNITY
Start: 2019-09-04 | End: 2020-07-06

## 2020-07-06 RX ORDER — SUMATRIPTAN SUCCINATE AND NAPROXEN SODIUM 85; 500 MG/1; MG/1
85-500 TABLET, FILM COATED ORAL
Refills: 0 | Status: DISCONTINUED | COMMUNITY
End: 2020-07-06

## 2020-07-06 RX ORDER — HYDROCHLOROTHIAZIDE 12.5 MG/1
TABLET ORAL
Refills: 0 | Status: DISCONTINUED | COMMUNITY
End: 2020-07-06

## 2020-07-06 RX ORDER — LOSARTAN POTASSIUM 100 MG/1
TABLET, FILM COATED ORAL
Refills: 0 | Status: DISCONTINUED | COMMUNITY
End: 2020-07-06

## 2020-07-06 RX ORDER — OMEPRAZOLE 20 MG/1
20 TABLET, DELAYED RELEASE ORAL
Refills: 0 | Status: DISCONTINUED | COMMUNITY
End: 2020-07-06

## 2020-07-07 LAB
ALBUMIN SERPL ELPH-MCNC: 4.7 G/DL
ALP BLD-CCNC: 49 U/L
ALT SERPL-CCNC: 20 U/L
ANION GAP SERPL CALC-SCNC: 16 MMOL/L
APPEARANCE: CLEAR
AST SERPL-CCNC: 16 U/L
BASOPHILS # BLD AUTO: 0.04 K/UL
BASOPHILS NFR BLD AUTO: 0.4 %
BILIRUB SERPL-MCNC: 0.4 MG/DL
BILIRUBIN URINE: NEGATIVE
BLOOD URINE: NEGATIVE
BUN SERPL-MCNC: 13 MG/DL
CALCIUM SERPL-MCNC: 9.7 MG/DL
CHLORIDE SERPL-SCNC: 103 MMOL/L
CO2 SERPL-SCNC: 23 MMOL/L
COLOR: NORMAL
CREAT SERPL-MCNC: 0.82 MG/DL
EOSINOPHIL # BLD AUTO: 0.23 K/UL
EOSINOPHIL NFR BLD AUTO: 2.5 %
ESTIMATED AVERAGE GLUCOSE: 97 MG/DL
GLUCOSE QUALITATIVE U: NEGATIVE
GLUCOSE SERPL-MCNC: 88 MG/DL
HBA1C MFR BLD HPLC: 5 %
HCT VFR BLD CALC: 44.9 %
HGB BLD-MCNC: 14.9 G/DL
IMM GRANULOCYTES NFR BLD AUTO: 0.2 %
KETONES URINE: NEGATIVE
LEUKOCYTE ESTERASE URINE: NEGATIVE
LYMPHOCYTES # BLD AUTO: 2.95 K/UL
LYMPHOCYTES NFR BLD AUTO: 31.9 %
MAN DIFF?: NORMAL
MCHC RBC-ENTMCNC: 29.6 PG
MCHC RBC-ENTMCNC: 33.2 GM/DL
MCV RBC AUTO: 89.1 FL
MONOCYTES # BLD AUTO: 0.7 K/UL
MONOCYTES NFR BLD AUTO: 7.6 %
NEUTROPHILS # BLD AUTO: 5.3 K/UL
NEUTROPHILS NFR BLD AUTO: 57.4 %
NITRITE URINE: NEGATIVE
PH URINE: 5.5
PLATELET # BLD AUTO: 245 K/UL
POTASSIUM SERPL-SCNC: 4.1 MMOL/L
PROT SERPL-MCNC: 7.6 G/DL
PROTEIN URINE: NEGATIVE
RBC # BLD: 5.04 M/UL
RBC # FLD: 13.2 %
SARS-COV-2 IGG SERPL IA-ACNC: <3.8 AU/ML
SARS-COV-2 IGG SERPL QL IA: NEGATIVE
SODIUM SERPL-SCNC: 142 MMOL/L
SPECIFIC GRAVITY URINE: 1.01
UROBILINOGEN URINE: NORMAL
WBC # FLD AUTO: 9.24 K/UL

## 2020-07-08 ENCOUNTER — ASOB RESULT (OUTPATIENT)
Age: 50
End: 2020-07-08

## 2020-07-08 ENCOUNTER — APPOINTMENT (OUTPATIENT)
Dept: OBGYN | Facility: CLINIC | Age: 50
End: 2020-07-08
Payer: COMMERCIAL

## 2020-07-08 PROCEDURE — 76830 TRANSVAGINAL US NON-OB: CPT

## 2020-07-08 PROCEDURE — 76857 US EXAM PELVIC LIMITED: CPT

## 2020-07-09 ENCOUNTER — APPOINTMENT (OUTPATIENT)
Dept: CARDIOLOGY | Facility: CLINIC | Age: 50
End: 2020-07-09
Payer: COMMERCIAL

## 2020-07-09 ENCOUNTER — NON-APPOINTMENT (OUTPATIENT)
Age: 50
End: 2020-07-09

## 2020-07-09 VITALS
OXYGEN SATURATION: 98 % | TEMPERATURE: 98.4 F | WEIGHT: 247 LBS | RESPIRATION RATE: 14 BRPM | HEART RATE: 70 BPM | BODY MASS INDEX: 45.18 KG/M2

## 2020-07-09 VITALS — DIASTOLIC BLOOD PRESSURE: 84 MMHG | SYSTOLIC BLOOD PRESSURE: 130 MMHG

## 2020-07-09 PROCEDURE — 93306 TTE W/DOPPLER COMPLETE: CPT

## 2020-07-09 PROCEDURE — 99213 OFFICE O/P EST LOW 20 MIN: CPT

## 2020-07-09 PROCEDURE — 93000 ELECTROCARDIOGRAM COMPLETE: CPT

## 2020-07-10 LAB
ALBUMIN SERPL ELPH-MCNC: 4.8 G/DL
ALP BLD-CCNC: 48 U/L
ALT SERPL-CCNC: 18 U/L
ANION GAP SERPL CALC-SCNC: 16 MMOL/L
AST SERPL-CCNC: 15 U/L
BILIRUB SERPL-MCNC: 0.4 MG/DL
BUN SERPL-MCNC: 13 MG/DL
CALCIUM SERPL-MCNC: 9.5 MG/DL
CHLORIDE SERPL-SCNC: 99 MMOL/L
CHOLEST SERPL-MCNC: 246 MG/DL
CHOLEST/HDLC SERPL: 4.1 RATIO
CO2 SERPL-SCNC: 25 MMOL/L
CREAT SERPL-MCNC: 0.68 MG/DL
GLUCOSE SERPL-MCNC: 81 MG/DL
HDLC SERPL-MCNC: 60 MG/DL
LDLC SERPL CALC-MCNC: 153 MG/DL
POTASSIUM SERPL-SCNC: 4.6 MMOL/L
PROT SERPL-MCNC: 7.4 G/DL
SODIUM SERPL-SCNC: 140 MMOL/L
TRIGL SERPL-MCNC: 165 MG/DL

## 2020-07-14 ENCOUNTER — APPOINTMENT (OUTPATIENT)
Dept: INTERNAL MEDICINE | Facility: CLINIC | Age: 50
End: 2020-07-14
Payer: COMMERCIAL

## 2020-07-14 VITALS — SYSTOLIC BLOOD PRESSURE: 136 MMHG | DIASTOLIC BLOOD PRESSURE: 86 MMHG

## 2020-07-14 VITALS
OXYGEN SATURATION: 97 % | SYSTOLIC BLOOD PRESSURE: 146 MMHG | DIASTOLIC BLOOD PRESSURE: 93 MMHG | HEART RATE: 87 BPM | WEIGHT: 245 LBS | BODY MASS INDEX: 44.81 KG/M2 | RESPIRATION RATE: 14 BRPM | TEMPERATURE: 98.5 F

## 2020-07-14 PROCEDURE — 99214 OFFICE O/P EST MOD 30 MIN: CPT

## 2020-07-20 ENCOUNTER — APPOINTMENT (OUTPATIENT)
Dept: INTERNAL MEDICINE | Facility: CLINIC | Age: 50
End: 2020-07-20
Payer: COMMERCIAL

## 2020-07-20 VITALS
DIASTOLIC BLOOD PRESSURE: 79 MMHG | OXYGEN SATURATION: 98 % | BODY MASS INDEX: 45.18 KG/M2 | WEIGHT: 247 LBS | SYSTOLIC BLOOD PRESSURE: 145 MMHG | RESPIRATION RATE: 14 BRPM | HEART RATE: 71 BPM

## 2020-07-20 DIAGNOSIS — R60.0 LOCALIZED EDEMA: ICD-10-CM

## 2020-07-20 PROCEDURE — 99214 OFFICE O/P EST MOD 30 MIN: CPT

## 2020-07-20 NOTE — HISTORY OF PRESENT ILLNESS
[de-identified] : \par Ms. KAMARI PASTOR is a 49 year old female presents for follow up visit for lower extremity edema. \par Pt was started on Lasix 20mg daily about 3 weeks ago for increased lower extremity edema. \par She was seen last week for follow up and at that time she reported some  improvement in the swelling and she had lost 2lbs\par Yesterday pt states that her legs got very swollen again and took an extra 20mg of Lasix yestetrday around 5pm\par She says she did not really urinate more last night, however this morning it was the first time she actually urinated more than normal\par She also followed up with cardiology and had an echocardiogram done on July 9th that was normal. Also had seen vascular and everything was ok\par She otherwise denies SOB, chest pain, abdominal pain, N/V/D, headache, dizziness \par Today she says she is 2 lbs heavier again\par Pt states she is starting to get worried. Pt has been heavier in the past and never had this much swelling in her feet

## 2020-07-20 NOTE — ASSESSMENT
[FreeTextEntry1] : \par Lower Leg Edema\par continue Lasix 20mg daily\par low sodium diet\par exercise and weight loss\par continue to follow with cardiology\par \par Kidney Cyst\par urology referral given\par renal US\par \par F/U 1 month

## 2020-07-20 NOTE — REVIEW OF SYSTEMS
[Negative] : Heme/Lymph [Chest Pain] : no chest pain [Orthopnea] : no orthopnea [Palpitations] : no palpitations [FreeTextEntry1] : l [FreeTextEntry5] : leg edema

## 2020-07-20 NOTE — HISTORY OF PRESENT ILLNESS
[de-identified] : \par Ms. KAMARI PASTOR is a 49 year old female presents for follow up visit for lower extremity edema. She was started on Lasix 20mg daily last week for increased lower extremity edema. She reports mild improvement in the swelling. She has lost 2lbs since her last visit. She also followed up with cardiology and had an echocardiogram done that was normal. She otherwise denies SOB, chest pain, abdominal pain, N/V/D, headache, dizziness \par

## 2020-07-20 NOTE — ASSESSMENT
[FreeTextEntry1] : Leg edema\par cont Lasix 20mg \par referred to vascular ( pt wants to go to another vascular for a second opinion )\par we'll order CT abdomen and pelvis to R/O any possible iliac/vena cava obstruction

## 2020-07-20 NOTE — PHYSICAL EXAM
[No Acute Distress] : no acute distress [Well Nourished] : well nourished [Well Developed] : well developed [Well-Appearing] : well-appearing [Normal Sclera/Conjunctiva] : normal sclera/conjunctiva [PERRL] : pupils equal round and reactive to light [EOMI] : extraocular movements intact [Normal Outer Ear/Nose] : the outer ears and nose were normal in appearance [No JVD] : no jugular venous distention [No Lymphadenopathy] : no lymphadenopathy [No Respiratory Distress] : no respiratory distress  [No Accessory Muscle Use] : no accessory muscle use [Normal Rate] : normal rate  [Clear to Auscultation] : lungs were clear to auscultation bilaterally [Normal S1, S2] : normal S1 and S2 [Regular Rhythm] : with a regular rhythm [No Murmur] : no murmur heard [No Extremity Clubbing/Cyanosis] : no extremity clubbing/cyanosis [Soft] : abdomen soft [Non Tender] : non-tender [Non-distended] : non-distended [Normal Bowel Sounds] : normal bowel sounds [Normal Posterior Cervical Nodes] : no posterior cervical lymphadenopathy [Normal Anterior Cervical Nodes] : no anterior cervical lymphadenopathy [No CVA Tenderness] : no CVA  tenderness [No Joint Swelling] : no joint swelling [No Spinal Tenderness] : no spinal tenderness [Grossly Normal Strength/Tone] : grossly normal strength/tone [No Rash] : no rash [Coordination Grossly Intact] : coordination grossly intact [No Focal Deficits] : no focal deficits [Normal Gait] : normal gait [Speech Grossly Normal] : speech grossly normal [Normal Affect] : the affect was normal [Alert and Oriented x3] : oriented to person, place, and time [Normal Insight/Judgement] : insight and judgment were intact [de-identified] : + trace nonpitting pedal edema

## 2020-07-20 NOTE — PHYSICAL EXAM
[Normal Sclera/Conjunctiva] : normal sclera/conjunctiva [Normal Outer Ear/Nose] : the outer ears and nose were normal in appearance [No JVD] : no jugular venous distention [Soft] : abdomen soft [Normal] : normal rate, regular rhythm, normal S1 and S2 and no murmur heard [Non Tender] : non-tender [Non-distended] : non-distended [No Joint Swelling] : no joint swelling [No CVA Tenderness] : no CVA  tenderness [Grossly Normal Strength/Tone] : grossly normal strength/tone [No Rash] : no rash [No Focal Deficits] : no focal deficits [Coordination Grossly Intact] : coordination grossly intact [Normal Gait] : normal gait [Speech Grossly Normal] : speech grossly normal [Normal Affect] : the affect was normal [Alert and Oriented x3] : oriented to person, place, and time [Normal Insight/Judgement] : insight and judgment were intact [de-identified] : b/l non pitting edema

## 2020-07-21 NOTE — PHYSICAL EXAM
[General Appearance - Well Developed] : well developed [Normal Appearance] : normal appearance [Well Groomed] : well groomed [General Appearance - Well Nourished] : well nourished [No Deformities] : no deformities [General Appearance - In No Acute Distress] : no acute distress [Normal Conjunctiva] : the conjunctiva exhibited no abnormalities [Normal Oral Mucosa] : normal oral mucosa [No Oral Pallor] : no oral pallor [No Oral Cyanosis] : no oral cyanosis [Normal Jugular Venous A Waves Present] : normal jugular venous A waves present [Normal Jugular Venous V Waves Present] : normal jugular venous V waves present [Rhythm Regular] : regular [Normal Rate] : normal [No Murmur] : no murmurs heard [Normal S1] : normal S1 [Normal S2] : normal S2 [___ +] : bilateral [unfilled]U+ pitting edema to the ankles [Respiration, Rhythm And Depth] : normal respiratory rhythm and effort [Abdomen Soft] : soft [Exaggerated Use Of Accessory Muscles For Inspiration] : no accessory muscle use [Auscultation Breath Sounds / Voice Sounds] : lungs were clear to auscultation bilaterally [Abdomen Tenderness] : non-tender [Gait - Sufficient For Exercise Testing] : the gait was sufficient for exercise testing [Abnormal Walk] : normal gait [Petechial Hemorrhages (___cm)] : no petechial hemorrhages [Nail Clubbing] : no clubbing of the fingernails [Cyanosis, Localized] : no localized cyanosis [] : no rash [Skin Color & Pigmentation] : normal skin color and pigmentation [Affect] : the affect was normal [Oriented To Time, Place, And Person] : oriented to person, place, and time [Mood] : the mood was normal [No Anxiety] : not feeling anxious [FreeTextEntry1] : Extraocular muscles intact. Anicteric sclerae.  [S3] : no S3 [Right Carotid Bruit] : no bruit heard over the right carotid [Left Carotid Bruit] : no bruit heard over the left carotid [Bruit] : no bruit heard [Bowel Sounds] : normal bowel sounds [No Skin Ulcers] : no skin ulcer

## 2020-07-21 NOTE — HISTORY OF PRESENT ILLNESS
[FreeTextEntry1] : Patient is a 49 year old female with a history of hypertension, hyperlipidemia, and family history of CAD who presents for evaluation of lower extremity edema. For the past 3-4 weeks, she developed swelling in her ankles and her feet, and has felt that her lower legs have been heavier and stiff. She previously had a venous Doppler done with a vascular surgeon that showed no acute DVT and she was told everything was okay. Three days ago her HCTZ was switched to Lasix 20mg daily. She also has noticed that she has been slightly more out of breath than usual while climbing stairs. She otherwise denies any chest pain, shortness of breath at rest, palpitations, headaches or dizziness.

## 2020-07-21 NOTE — DISCUSSION/SUMMARY
[FreeTextEntry1] : IMPRESSION: Mrs. Diaz is a 49 year old female with a history of hypertension, hyperlipidemia, and family history of CAD who presents for evaluation of lower extremity edema.\par \par PLAN:\par 1. Given her lower extremity edema she will continue on the Lasix 20mg daily. We also discussed the importance of a low sodium diet and weight loss. Her echocardiogram today showed normal LV function without any significant valvular abnormalities. Given that she just started taking Lasix, I have not made any changes to her dose at this time.  She has a follow up appointment with you next week; if her edema persists than we will consider increasing her to 40mg of Lasix daily.\par 2. I will be checking a fasting lipid panel and CMP today given her hyperlipidemia. For now she will continue on low cholesterol/low fat diet.\par 3. Her blood pressure is acceptable at this time, thus she will continue on Losartan 50 mg daily as well as Lasix.\par 4. She will follow up in 1 month or sooner if she develops any new or worsening symptoms in the interim.

## 2020-07-27 ENCOUNTER — APPOINTMENT (OUTPATIENT)
Dept: UROLOGY | Facility: CLINIC | Age: 50
End: 2020-07-27
Payer: COMMERCIAL

## 2020-07-27 VITALS
HEIGHT: 62 IN | TEMPERATURE: 99 F | SYSTOLIC BLOOD PRESSURE: 141 MMHG | RESPIRATION RATE: 12 BRPM | WEIGHT: 248 LBS | DIASTOLIC BLOOD PRESSURE: 84 MMHG | OXYGEN SATURATION: 98 % | BODY MASS INDEX: 45.64 KG/M2 | HEART RATE: 93 BPM

## 2020-07-27 DIAGNOSIS — N28.1 CYST OF KIDNEY, ACQUIRED: ICD-10-CM

## 2020-07-27 PROCEDURE — 99213 OFFICE O/P EST LOW 20 MIN: CPT

## 2020-07-27 NOTE — ASSESSMENT
[FreeTextEntry1] : Very pleasant 49-year-old woman who presents for follow-up of microscopic hematuria, bilateral renal cysts\par -Ultrasound images reviewed\par -Prior records reviewed including cystoscopy\par -We discussed the benign nature of renal cysts\par -Follow-up in 1 year with repeat renal ultrasound

## 2020-07-27 NOTE — HISTORY OF PRESENT ILLNESS
[FreeTextEntry1] : Very pleasant 49-year-old woman who presents for follow-up of bilateral renal cysts and history of microscopic hematuria.  She feels well.  She denies dysuria.  No gross hematuria.  No flank pain or suprapubic pain.  Recent urinalysis demonstrated no microscopic blood.  She recently underwent a renal ultrasound which demonstrated bilateral simple renal cysts.  She underwent a cystoscopy approximately 2 years ago with Dr. Goode which demonstrated no abnormalities. [Urinary Incontinence] : no urinary incontinence [Urinary Retention] : no urinary retention [Urinary Urgency] : no urinary urgency [Urinary Frequency] : no urinary frequency [Hematuria - Microscopic] : microscopic hematuria [Nocturia] : no nocturia

## 2020-07-27 NOTE — PHYSICAL EXAM
Call regarding: Patient is on a shoe and the velcro came undone. Patient reached out to PCP but is out of town and pt needs a order to be placed. Please placed order so patient can get this order from Dr. Diaz's office. [facility] office is too far and patient does not have transportation.     Pt can be reached at # [phone]      Okay to leave detailed voice mail?  Yes     [General Appearance - Well Developed] : well developed [Normal Appearance] : normal appearance [General Appearance - Well Nourished] : well nourished [General Appearance - In No Acute Distress] : no acute distress [Well Groomed] : well groomed [Abdomen Tenderness] : non-tender [Costovertebral Angle Tenderness] : no ~M costovertebral angle tenderness [Abdomen Soft] : soft [Urinary Bladder Findings] : the bladder was normal on palpation [] : no rash [Edema] : no peripheral edema [Respiration, Rhythm And Depth] : normal respiratory rhythm and effort [Exaggerated Use Of Accessory Muscles For Inspiration] : no accessory muscle use [Affect] : the affect was normal [Oriented To Time, Place, And Person] : oriented to person, place, and time [Normal Station and Gait] : the gait and station were normal for the patient's age [Mood] : the mood was normal [Not Anxious] : not anxious [No Focal Deficits] : no focal deficits [No Palpable Adenopathy] : no palpable adenopathy

## 2020-07-30 ENCOUNTER — APPOINTMENT (OUTPATIENT)
Dept: OPHTHALMOLOGY | Facility: CLINIC | Age: 50
End: 2020-07-30
Payer: COMMERCIAL

## 2020-07-30 ENCOUNTER — NON-APPOINTMENT (OUTPATIENT)
Age: 50
End: 2020-07-30

## 2020-07-30 PROCEDURE — 92012 INTRM OPH EXAM EST PATIENT: CPT

## 2020-07-30 PROCEDURE — 92133 CPTRZD OPH DX IMG PST SGM ON: CPT

## 2020-08-04 NOTE — ASSESSMENT
[FreeTextEntry1] : leg edema\par d/c hctz\par start Lasix 20mg po daily\par referred for echo\par f/u with cardiology\par blood work ordered\par \par Hx of migraine headaches\par cont  sumatriptan-renewed today\par \par follow up in one week for lab results\par

## 2020-08-04 NOTE — PHYSICAL EXAM
[No Acute Distress] : no acute distress [Well Nourished] : well nourished [Well Developed] : well developed [Well-Appearing] : well-appearing [PERRL] : pupils equal round and reactive to light [EOMI] : extraocular movements intact [Normal Sclera/Conjunctiva] : normal sclera/conjunctiva [Normal Outer Ear/Nose] : the outer ears and nose were normal in appearance [No Lymphadenopathy] : no lymphadenopathy [No JVD] : no jugular venous distention [No Respiratory Distress] : no respiratory distress  [No Accessory Muscle Use] : no accessory muscle use [Clear to Auscultation] : lungs were clear to auscultation bilaterally [Normal Rate] : normal rate  [Normal S1, S2] : normal S1 and S2 [No Murmur] : no murmur heard [Regular Rhythm] : with a regular rhythm [No Extremity Clubbing/Cyanosis] : no extremity clubbing/cyanosis [Soft] : abdomen soft [Non-distended] : non-distended [Non Tender] : non-tender [Normal Posterior Cervical Nodes] : no posterior cervical lymphadenopathy [Normal Bowel Sounds] : normal bowel sounds [Normal Anterior Cervical Nodes] : no anterior cervical lymphadenopathy [No CVA Tenderness] : no CVA  tenderness [No Spinal Tenderness] : no spinal tenderness [No Joint Swelling] : no joint swelling [Grossly Normal Strength/Tone] : grossly normal strength/tone [No Focal Deficits] : no focal deficits [No Rash] : no rash [Coordination Grossly Intact] : coordination grossly intact [Speech Grossly Normal] : speech grossly normal [Normal Gait] : normal gait [Alert and Oriented x3] : oriented to person, place, and time [Normal Affect] : the affect was normal [Normal Insight/Judgement] : insight and judgment were intact [de-identified] : + non pitting edema

## 2020-08-04 NOTE — HISTORY OF PRESENT ILLNESS
[de-identified] : Ms. KAMARI PASTOR is a 49 year old female, with hx of HTN, hypercholesterolemia, presents for follow up visit\par She c/o  lower leg swelling. She has hx of obesity and chronic leg swelling. She had lost weight on weight watchers but gained some back during the quarantine. She is back on weight watchers now. She saw vascular last week who as per pt told her to loose weight and prescribed compression stockings\par Pt says her leg swelling is worse than it was even when she was heavier. she is on hctz 25mg but she feels it's not helping\par Denies SOB, chest pain, abdominal pain, N/V/D, headache, dizziness \par \par \par \par

## 2020-08-05 ENCOUNTER — APPOINTMENT (OUTPATIENT)
Dept: CARDIOLOGY | Facility: CLINIC | Age: 50
End: 2020-08-05
Payer: COMMERCIAL

## 2020-08-05 VITALS
WEIGHT: 249 LBS | HEIGHT: 62 IN | SYSTOLIC BLOOD PRESSURE: 135 MMHG | OXYGEN SATURATION: 96 % | DIASTOLIC BLOOD PRESSURE: 85 MMHG | BODY MASS INDEX: 45.82 KG/M2 | HEART RATE: 81 BPM | TEMPERATURE: 97.8 F

## 2020-08-05 PROCEDURE — 99214 OFFICE O/P EST MOD 30 MIN: CPT

## 2020-08-05 NOTE — PHYSICAL EXAM
[General Appearance - Well Developed] : well developed [Normal Appearance] : normal appearance [Well Groomed] : well groomed [Normal Conjunctiva] : the conjunctiva exhibited no abnormalities [No Deformities] : no deformities [General Appearance - Well Nourished] : well nourished [General Appearance - In No Acute Distress] : no acute distress [Eyelids - No Xanthelasma] : the eyelids demonstrated no xanthelasmas [Heart Rate And Rhythm] : heart rate and rhythm were normal [Murmurs] : no murmurs present [Heart Sounds] : normal S1 and S2 [Respiration, Rhythm And Depth] : normal respiratory rhythm and effort [Exaggerated Use Of Accessory Muscles For Inspiration] : no accessory muscle use [Auscultation Breath Sounds / Voice Sounds] : lungs were clear to auscultation bilaterally [Abdomen Tenderness] : non-tender [Abdomen Soft] : soft [] : no hepato-splenomegaly [Abdomen Mass (___ Cm)] : no abdominal mass palpated [Gait - Sufficient For Exercise Testing] : the gait was sufficient for exercise testing [Abnormal Walk] : normal gait [Oriented To Time, Place, And Person] : oriented to person, place, and time [FreeTextEntry1] : ankle cutoff sign.  strong pedal pulses.  scattered reticular veins.  Trace pitting edema ankles.  Otherwise most of the leg is non-pitting adipose.  [Mood] : the mood was normal [No Anxiety] : not feeling anxious [Affect] : the affect was normal

## 2020-08-05 NOTE — REASON FOR VISIT
[FreeTextEntry1] : 49F here for second opinion for leg swelling\par Progressive through the day\par Has gained weight. \par Trying weight watchers.\par entire leg affected, bilaterally\par No improvement with diuretic\par \par When she gains weight its throughout the body, when loses rudy is upper body.\par No ulceration\par Legs feel tight and heavy.\par \par Not wearing compression\par Duplex showed no DVT or reflux\par \par No EOTH\par Limits salt intake\par not exercising [Initial Evaluation] : an initial evaluation of

## 2020-08-05 NOTE — ASSESSMENT
[FreeTextEntry1] : Assessment:\par 1.  Lipidema\par \par Plan\par 1. This is lipidema\par 2.  She was given information about lipidema\par 3.  Mainstay treatment is avoidance of weight gain, weight loss, and compression garments\par 4.  In severe cases liposuction is sometimes performed, but I do not recommend this at this moment.  \par 5.  Increase activity and exercise daily.  Cotninue with weight loss.  She will notice that she easily loses weight above her waist, but the leg adipose will be stubborn to come off\par 6.  No role for diuretic in this case\par 7.  Return PRN . \par \par She was grateful to have an answer.

## 2020-08-14 ENCOUNTER — TRANSCRIPTION ENCOUNTER (OUTPATIENT)
Age: 50
End: 2020-08-14

## 2020-08-24 ENCOUNTER — APPOINTMENT (OUTPATIENT)
Dept: INTERNAL MEDICINE | Facility: CLINIC | Age: 50
End: 2020-08-24

## 2020-08-26 ENCOUNTER — APPOINTMENT (OUTPATIENT)
Dept: CARDIOLOGY | Facility: CLINIC | Age: 50
End: 2020-08-26

## 2020-09-22 ENCOUNTER — APPOINTMENT (OUTPATIENT)
Dept: INTERNAL MEDICINE | Facility: CLINIC | Age: 50
End: 2020-09-22
Payer: COMMERCIAL

## 2020-09-22 VITALS
OXYGEN SATURATION: 96 % | TEMPERATURE: 97.5 F | HEART RATE: 88 BPM | SYSTOLIC BLOOD PRESSURE: 147 MMHG | DIASTOLIC BLOOD PRESSURE: 83 MMHG | RESPIRATION RATE: 12 BRPM | HEIGHT: 62 IN

## 2020-09-22 PROCEDURE — G0008: CPT

## 2020-09-22 PROCEDURE — 99214 OFFICE O/P EST MOD 30 MIN: CPT | Mod: 25

## 2020-09-22 PROCEDURE — 96372 THER/PROPH/DIAG INJ SC/IM: CPT

## 2020-09-22 PROCEDURE — 90682 RIV4 VACC RECOMBINANT DNA IM: CPT

## 2020-09-30 NOTE — PHYSICAL EXAM
[Normal Sclera/Conjunctiva] : normal sclera/conjunctiva [Normal Outer Ear/Nose] : the outer ears and nose were normal in appearance [No JVD] : no jugular venous distention [Normal] : normal rate, regular rhythm, normal S1 and S2 and no murmur heard [Soft] : abdomen soft [Non Tender] : non-tender [Non-distended] : non-distended [Normal Anterior Cervical Nodes] : no anterior cervical lymphadenopathy [No CVA Tenderness] : no CVA  tenderness [No Joint Swelling] : no joint swelling [No Rash] : no rash [Coordination Grossly Intact] : coordination grossly intact [No Focal Deficits] : no focal deficits [Normal Gait] : normal gait [Speech Grossly Normal] : speech grossly normal [Normal Affect] : the affect was normal [Alert and Oriented x3] : oriented to person, place, and time [Normal Insight/Judgement] : insight and judgment were intact [de-identified] : b/l chronic non pitting edema

## 2020-09-30 NOTE — HISTORY OF PRESENT ILLNESS
[de-identified] : \par Ms. KAMARI PASTOR is a 49 year old female presents for follow up visit.\par She followed up with vascular cardiology and was diagnosed with lipedema\par She is still on Lasix 20mg daily  She reports mild improvement in the swelling. \par Denies SOB, chest pain, abdominal pain, N/V/D, headache, dizziness \par

## 2020-10-27 ENCOUNTER — TRANSCRIPTION ENCOUNTER (OUTPATIENT)
Age: 50
End: 2020-10-27

## 2020-11-06 ENCOUNTER — TRANSCRIPTION ENCOUNTER (OUTPATIENT)
Age: 50
End: 2020-11-06

## 2020-12-23 PROBLEM — Z01.419 ENCOUNTER FOR GYNECOLOGICAL EXAMINATION WITHOUT ABNORMAL FINDING: Status: RESOLVED | Noted: 2018-03-19 | Resolved: 2020-12-23

## 2020-12-31 ENCOUNTER — TRANSCRIPTION ENCOUNTER (OUTPATIENT)
Age: 50
End: 2020-12-31

## 2021-01-04 ENCOUNTER — NON-APPOINTMENT (OUTPATIENT)
Age: 51
End: 2021-01-04

## 2021-01-08 ENCOUNTER — NON-APPOINTMENT (OUTPATIENT)
Age: 51
End: 2021-01-08

## 2021-01-11 ENCOUNTER — APPOINTMENT (OUTPATIENT)
Dept: INTERNAL MEDICINE | Facility: CLINIC | Age: 51
End: 2021-01-11
Payer: COMMERCIAL

## 2021-01-11 PROCEDURE — 99214 OFFICE O/P EST MOD 30 MIN: CPT | Mod: 95

## 2021-01-11 NOTE — ASSESSMENT
[FreeTextEntry1] : COVID infection-improving\par cont fluids, Tylenol, motrin prn\par pt instructed if symptoms worsen to seek immediate medical attention

## 2021-01-11 NOTE — HISTORY OF PRESENT ILLNESS
[Home] : at home, [unfilled] , at the time of the visit. [Medical Office: ([facility])___] : at the medical office located in  [Verbal consent obtained from patient] : the patient, [unfilled] [de-identified] : Ms. KAMARI PASTOR is a 50 year old female, seen in telemedicine for follow up visit\par Pt states she tested positive for COVID on January 2nd ( she gets tested once a week for work)\par She developed body aches, chest pain, upper back pain and some SOB a day or 2 later. Says pains and SOB have been on and off\par Went to [facility] today, had CXR. As per pt CXR was negative and her O2 sat was 99%\par She is feeling better Says the pain still comes and goes but it's not as severe \par Takes tylenol every 4 hours and takes motrin prn\par Denies SOB, chest pain, abdominal pain, N/V/D, leg swelling, headache, dizziness \par Offers no complaints\par \par \par

## 2021-01-13 ENCOUNTER — APPOINTMENT (OUTPATIENT)
Dept: INTERNAL MEDICINE | Facility: CLINIC | Age: 51
End: 2021-01-13
Payer: COMMERCIAL

## 2021-01-13 ENCOUNTER — APPOINTMENT (OUTPATIENT)
Dept: PULMONOLOGY | Facility: CLINIC | Age: 51
End: 2021-01-13
Payer: COMMERCIAL

## 2021-01-13 DIAGNOSIS — Z86.79 PERSONAL HISTORY OF OTHER DISEASES OF THE CIRCULATORY SYSTEM: ICD-10-CM

## 2021-01-13 DIAGNOSIS — Z82.49 FAMILY HISTORY OF ISCHEMIC HEART DISEASE AND OTHER DISEASES OF THE CIRCULATORY SYSTEM: ICD-10-CM

## 2021-01-13 PROCEDURE — 99214 OFFICE O/P EST MOD 30 MIN: CPT | Mod: 95

## 2021-01-13 PROCEDURE — 99204 OFFICE O/P NEW MOD 45 MIN: CPT | Mod: 95

## 2021-01-14 ENCOUNTER — EMERGENCY (EMERGENCY)
Facility: HOSPITAL | Age: 51
LOS: 1 days | Discharge: HOME | End: 2021-01-14
Attending: EMERGENCY MEDICINE | Admitting: EMERGENCY MEDICINE
Payer: COMMERCIAL

## 2021-01-14 VITALS
HEART RATE: 77 BPM | SYSTOLIC BLOOD PRESSURE: 152 MMHG | RESPIRATION RATE: 16 BRPM | DIASTOLIC BLOOD PRESSURE: 90 MMHG | OXYGEN SATURATION: 98 % | TEMPERATURE: 98 F

## 2021-01-14 VITALS
RESPIRATION RATE: 15 BRPM | SYSTOLIC BLOOD PRESSURE: 168 MMHG | HEART RATE: 101 BPM | OXYGEN SATURATION: 98 % | TEMPERATURE: 98 F | DIASTOLIC BLOOD PRESSURE: 99 MMHG

## 2021-01-14 PROBLEM — Z82.49 FAMILY HISTORY OF CORONARY ARTERY DISEASE: Status: ACTIVE | Noted: 2020-07-09

## 2021-01-14 LAB
ANION GAP SERPL CALC-SCNC: 12 MMOL/L — SIGNIFICANT CHANGE UP (ref 7–14)
BASOPHILS # BLD AUTO: 0.03 K/UL — SIGNIFICANT CHANGE UP (ref 0–0.2)
BASOPHILS NFR BLD AUTO: 0.9 % — SIGNIFICANT CHANGE UP (ref 0–2)
BUN SERPL-MCNC: 17 MG/DL — SIGNIFICANT CHANGE UP (ref 7–23)
CALCIUM SERPL-MCNC: 9.7 MG/DL — SIGNIFICANT CHANGE UP (ref 8.4–10.5)
CHLORIDE SERPL-SCNC: 102 MMOL/L — SIGNIFICANT CHANGE UP (ref 98–107)
CO2 SERPL-SCNC: 24 MMOL/L — SIGNIFICANT CHANGE UP (ref 22–31)
CREAT SERPL-MCNC: 0.62 MG/DL — SIGNIFICANT CHANGE UP (ref 0.5–1.3)
D DIMER BLD IA.RAPID-MCNC: 302 NG/ML DDU — HIGH
EOSINOPHIL # BLD AUTO: 0 K/UL — SIGNIFICANT CHANGE UP (ref 0–0.5)
EOSINOPHIL NFR BLD AUTO: 0 % — SIGNIFICANT CHANGE UP (ref 0–6)
GLUCOSE SERPL-MCNC: 138 MG/DL — HIGH (ref 70–99)
HCG SERPL-ACNC: <5 MIU/ML — SIGNIFICANT CHANGE UP
HCT VFR BLD CALC: 43.4 % — SIGNIFICANT CHANGE UP (ref 34.5–45)
HGB BLD-MCNC: 14.3 G/DL — SIGNIFICANT CHANGE UP (ref 11.5–15.5)
IANC: 2.51 K/UL — SIGNIFICANT CHANGE UP (ref 1.5–8.5)
LYMPHOCYTES # BLD AUTO: 0.39 K/UL — LOW (ref 1–3.3)
LYMPHOCYTES # BLD AUTO: 11.3 % — LOW (ref 13–44)
MCHC RBC-ENTMCNC: 28.5 PG — SIGNIFICANT CHANGE UP (ref 27–34)
MCHC RBC-ENTMCNC: 32.9 GM/DL — SIGNIFICANT CHANGE UP (ref 32–36)
MCV RBC AUTO: 86.5 FL — SIGNIFICANT CHANGE UP (ref 80–100)
MONOCYTES # BLD AUTO: 0.03 K/UL — SIGNIFICANT CHANGE UP (ref 0–0.9)
MONOCYTES NFR BLD AUTO: 0.9 % — LOW (ref 2–14)
NEUTROPHILS # BLD AUTO: 2.62 K/UL — SIGNIFICANT CHANGE UP (ref 1.8–7.4)
NEUTROPHILS NFR BLD AUTO: 74.8 % — SIGNIFICANT CHANGE UP (ref 43–77)
PLATELET # BLD AUTO: 213 K/UL — SIGNIFICANT CHANGE UP (ref 150–400)
POTASSIUM SERPL-MCNC: 3.9 MMOL/L — SIGNIFICANT CHANGE UP (ref 3.5–5.3)
POTASSIUM SERPL-SCNC: 3.9 MMOL/L — SIGNIFICANT CHANGE UP (ref 3.5–5.3)
RBC # BLD: 5.02 M/UL — SIGNIFICANT CHANGE UP (ref 3.8–5.2)
RBC # FLD: 13.2 % — SIGNIFICANT CHANGE UP (ref 10.3–14.5)
SODIUM SERPL-SCNC: 138 MMOL/L — SIGNIFICANT CHANGE UP (ref 135–145)
WBC # BLD: 3.43 K/UL — LOW (ref 3.8–10.5)
WBC # FLD AUTO: 3.43 K/UL — LOW (ref 3.8–10.5)

## 2021-01-14 PROCEDURE — 71275 CT ANGIOGRAPHY CHEST: CPT | Mod: 26

## 2021-01-14 PROCEDURE — 93010 ELECTROCARDIOGRAM REPORT: CPT

## 2021-01-14 PROCEDURE — 99284 EMERGENCY DEPT VISIT MOD MDM: CPT | Mod: 25

## 2021-01-14 NOTE — REVIEW OF SYSTEMS
[Fever] : fever [Fatigue] : fatigue [Chills] : chills [Poor Appetite] : poor appetite [Cough] : cough [Hemoptysis] : no hemoptysis [Chest Tightness] : chest tightness [Frequent URIs] : no frequent URIs [Sputum] : sputum [Dyspnea] : dyspnea [Pleuritic Pain] : pleuritic pain [Wheezing] : wheezing [A.M. Dry Mouth] : a.m. dry mouth [SOB on Exertion] : sob on exertion [Myalgias] : myalgias [Negative] : Endocrine

## 2021-01-14 NOTE — ASSESSMENT
[FreeTextEntry1] : In summary the patient is a 50-year-old obese female recently diagnosed with COVID-19 pneumonia who is being evaluated via telemedicine.  Of concern is her associated shortness of breath and back pain.  The patient is at high risk for pulmonary embolism secondary to COVID-19.  I am now putting her on Eliquis 5 mg twice daily well as dexamethasone 6 mg daily.  The patient denied discussed the side effects of Eliquis.  The patient will begin therapy on the 14th and a repeat telemedicine evaluation will be performed on the 18th.  The case was discussed with her PMD Dr. Elliott

## 2021-01-14 NOTE — ED PROVIDER NOTE - ATTENDING CONTRIBUTION TO CARE
I performed a face-to-face evaluation of the patient and performed a history and physical examination. I agree with the history and physical examination.    COVID. Had a first-time Pulm appt. by [facility].; Pulm started her on Eliquis 5 mg and said he wanted her to be seen in ED to eval for PE. No change in chronic CP since COVID began (12 days ago). Walking RA SaO2 94%, . Lungs clear. No change in chronic (B) LE lymphedema. Check d-dimer. If neg, no CT PA. If pos., CT PA. If no PE, call her Pulm to discuss stopping Eliquis.

## 2021-01-14 NOTE — ED PROVIDER NOTE - DISCHARGE DATE
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 Family notes urine is slightly darker than usual. No other sxs, stool normal brown and formed. Took tylenol prior to presentation.
15-Marquis-2021

## 2021-01-14 NOTE — ED ADULT NURSE NOTE - OBJECTIVE STATEMENT
Pt in Intake 7.  AOx4, skin warm, dry and intact.  Pt endorses being Covid-19 positive and presented to ED today for POX 94 % RA and mid-sternal chest pain.  IV access obtained and lab drawn and sent.  Pt speaking in full sentences.  Pt endorses doing a telehealth and being started on blood thinner and steroids.

## 2021-01-14 NOTE — ED PROVIDER NOTE - NSFOLLOWUPINSTRUCTIONS_ED_ALL_ED_FT
- Continue all regular medications  - Recommend stopping eliquis, discuss with your pulmonologist  - For pain, take tylenol or ibuprofen as directed on the packaging  - You were given copies of labs and/or imaging results if applicable, please take them to your follow up appointments  - Return to the ER for any worsening symptoms or concerns

## 2021-01-14 NOTE — ED PROVIDER NOTE - PHYSICAL EXAMINATION
Physical Exam:  Gen: NAD, AOx3, non-toxic appearing, able to ambulate without assistance  Head: NCAT  HEENT: EOMI, PEERLA, normal conjunctiva, tongue midline, oral mucosa moist  Lun% on RA and on ambulatory, CTAB, no respiratory distress, no wheezes/rhonchi/rales B/L, speaking in full sentences  CV: RRR, no murmurs, rubs or gallops, distal pulses 2+ b/l  Abd: soft, NT, ND, no guarding, no rigidity, no rebound tenderness, no CVA tenderness   MSK: no visible deformities, ROM normal in UE/LE, no back TTP  Neuro: No focal sensory or motor deficits  Skin: Warm, well perfused, no rash, mild non-pitting equal leg swelling b/l  Psych: normal affect, calm

## 2021-01-14 NOTE — ED PROVIDER NOTE - PROGRESS NOTE DETAILS
Mary PGY2: dimer (+), will get CTA, patient informed of plan. PA Moniqueic: Pt resting comfortably. NAD. Os2 sat 98. Pt given return precautions. CTA chest negative for PE

## 2021-01-14 NOTE — ED PROVIDER NOTE - CLINICAL SUMMARY MEDICAL DECISION MAKING FREE TEXT BOX
Nathaniel: COVID. Had a first-time Pulm appt. by [facility].; Pulm started her on Eliquis 5 mg and said he wanted her to be seen in ED to eval for PE. No change in chronic CP since COVID began (12 days ago). Walking RA SaO2 94%, . Lungs clear. No change in chronic (B) LE lymphedema. Check d-dimer. If neg, no CT PA. If pos., CT PA. If no PE, call her Pulm to discuss stopping Eliquis.

## 2021-01-14 NOTE — ED ADULT TRIAGE NOTE - CHIEF COMPLAINT QUOTE
Pt was dx with COVID 12 days ago, currently endorsing chest & back pain for "days" and intermittent shortness of breath. Denies fevers & cough. Pt endorses concern that her SpO2 was 94% at home. Pt's PMD put pt on Eliquis for "possible blood clot." Pt also taking decadron. Hx of HTN.

## 2021-01-14 NOTE — ED PROVIDER NOTE - OBJECTIVE STATEMENT
49yo female HTN, lymphedema p/w concern for blood clot. 12 days of covid symptoms including minimal dyspnea/cough, intermittent central chest tightness, R sided upper back pain. Saw pulmonologist last night and was prescribed low dose eliquis for potential blood clot. Denies new leg swelling, h/o PE/DVT, hemoptysis, recent surgery.

## 2021-01-14 NOTE — ED PROVIDER NOTE - PATIENT PORTAL LINK FT
You can access the FollowMyHealth Patient Portal offered by [facility] by registering at the following website: [email or web address]. By joining [facility]’s FollowMyHealth portal, you will also be able to view your health information using other applications (apps) compatible with our system.

## 2021-01-14 NOTE — HISTORY OF PRESENT ILLNESS
[Never] : never [TextBox_4] : Patient is a 50-year-old obese female who is being referred today for pulmonary consult because of COVID-19.  The patient complains of worsening cough shortness of breath and pleuritic chest pain.  She also complains of back pain and dyspnea on exertion.  She has taken no medicine other than Tylenol for her Covid pneumonia.  She also complains of occasional headaches and fevers with myalgias and potation

## 2021-01-14 NOTE — ED ADULT NURSE NOTE - DOES PATIENT HAVE ADVANCE DIRECTIVE
I called and told Roma that the RX will be at her pharmacy and to call before .  She stated that she will pick it up tomorrow   No

## 2021-01-14 NOTE — ED ADULT NURSE NOTE - NSIMPLEMENTINTERV_GEN_ALL_ED
Implemented All Universal Safety Interventions:  [place] to call system. Call bell, personal items and telephone within reach. Instruct patient to call for assistance. Room bathroom lighting operational. Non-slip footwear when patient is off stretcher. Physically safe environment: no spills, clutter or unnecessary equipment. Stretcher in lowest position, wheels locked, appropriate side rails in place.

## 2021-01-15 ENCOUNTER — RX RENEWAL (OUTPATIENT)
Age: 51
End: 2021-01-15

## 2021-01-25 ENCOUNTER — NON-APPOINTMENT (OUTPATIENT)
Age: 51
End: 2021-01-25

## 2021-01-25 PROBLEM — I89.0 LYMPHEDEMA, NOT ELSEWHERE CLASSIFIED: Chronic | Status: ACTIVE | Noted: 2021-01-14

## 2021-01-25 PROBLEM — I10 ESSENTIAL (PRIMARY) HYPERTENSION: Chronic | Status: ACTIVE | Noted: 2021-01-14

## 2021-02-01 NOTE — ASSESSMENT
[FreeTextEntry1] : CCOVID 19 infection, episode of wheezing\par increase po fluids\par cont Tylenol \par referred to pulmonary\par \par pt instructed if SOB / chest pain / worsening of symptoms to go immediately to the ER\par

## 2021-02-01 NOTE — HISTORY OF PRESENT ILLNESS
[Home] : at home, [unfilled] , at the time of the visit. [Medical Office: ([facility])___] : at the medical office located in  [Verbal consent obtained from patient] : the patient, [unfilled] [FreeTextEntry8] : Ms. KAMARI PASTOR is a 50 year old female seen ion telemedicine for an acute visit\par Pt tested positive for COVID on January 2nd. She was seen Monday on telemedicine. She had also gone to [facility] earlier on Monday  \par ( 2 days ago) had CXR and as per pt it was negative\par Pt says she continuous to have pain in her back and last night she woke up from sleep because she was wheezing. Says she is not really coughing and does not feel SOB. She felt SOB at the beginning of the week but not now\par She is worried though because she had the wheezing\par \par \par \par

## 2021-02-08 ENCOUNTER — NON-APPOINTMENT (OUTPATIENT)
Age: 51
End: 2021-02-08

## 2021-02-08 ENCOUNTER — APPOINTMENT (OUTPATIENT)
Dept: INTERNAL MEDICINE | Facility: CLINIC | Age: 51
End: 2021-02-08
Payer: COMMERCIAL

## 2021-02-08 VITALS — DIASTOLIC BLOOD PRESSURE: 100 MMHG | SYSTOLIC BLOOD PRESSURE: 160 MMHG

## 2021-02-08 VITALS
OXYGEN SATURATION: 97 % | DIASTOLIC BLOOD PRESSURE: 101 MMHG | BODY MASS INDEX: 46.56 KG/M2 | RESPIRATION RATE: 12 BRPM | TEMPERATURE: 97.3 F | WEIGHT: 253 LBS | HEIGHT: 62 IN | HEART RATE: 93 BPM | SYSTOLIC BLOOD PRESSURE: 166 MMHG

## 2021-02-08 PROCEDURE — 93000 ELECTROCARDIOGRAM COMPLETE: CPT

## 2021-02-08 PROCEDURE — 99072 ADDL SUPL MATRL&STAF TM PHE: CPT

## 2021-02-08 PROCEDURE — 99214 OFFICE O/P EST MOD 30 MIN: CPT | Mod: 25

## 2021-02-08 NOTE — HISTORY OF PRESENT ILLNESS
[de-identified] : Ms. KAMARI BARROSO is a 50 year old female, with hx of HTN, presents for follow up visit\par Pt tested positive for COVID on January 2nd. She was sick with loss of taste and smell, body aches and headaches. \par She was also seen in [facility] ER on January 14th. Had CTA chest. No PE\par She is feeling better now but says she still does not have full taste and smell back\par Says she still has some wheezing on and off since having COVID\par Also has been checking her BP at home and it's been high. She does report adding some salt to her food recently\par Denies SOB, chest pain, abdominal pain, N/V/D, leg swelling, headache, dizziness \par \par \par \par

## 2021-02-08 NOTE — ASSESSMENT
[FreeTextEntry1] : HTN\par EKG: NSR\par cont Lasix 20mg daily-renewed today\par we'll increase Losartan to 100mg daily\par \par s/p COVID infection\par referred for CXR\par f/u with pulmonary\par \par blood work ordered\par \par follow up in one week for BP check and lab results\par

## 2021-02-08 NOTE — PHYSICAL EXAM
[No Acute Distress] : no acute distress [Well Nourished] : well nourished [Well Developed] : well developed [Well-Appearing] : well-appearing [Normal Sclera/Conjunctiva] : normal sclera/conjunctiva [PERRL] : pupils equal round and reactive to light [EOMI] : extraocular movements intact [Normal Outer Ear/Nose] : the outer ears and nose were normal in appearance [Normal Oropharynx] : the oropharynx was normal [No JVD] : no jugular venous distention [No Lymphadenopathy] : no lymphadenopathy [Supple] : supple [Thyroid Normal, No Nodules] : the thyroid was normal and there were no nodules present [No Respiratory Distress] : no respiratory distress  [Normal Rate] : normal rate  [Regular Rhythm] : with a regular rhythm [Normal S1, S2] : normal S1 and S2 [No Murmur] : no murmur heard [No Extremity Clubbing/Cyanosis] : no extremity clubbing/cyanosis [Soft] : abdomen soft [Non Tender] : non-tender [Non-distended] : non-distended [Normal Bowel Sounds] : normal bowel sounds [Normal Posterior Cervical Nodes] : no posterior cervical lymphadenopathy [Normal Anterior Cervical Nodes] : no anterior cervical lymphadenopathy [No CVA Tenderness] : no CVA  tenderness [No Joint Swelling] : no joint swelling [Grossly Normal Strength/Tone] : grossly normal strength/tone [No Rash] : no rash [Coordination Grossly Intact] : coordination grossly intact [No Focal Deficits] : no focal deficits [Normal Gait] : normal gait [Speech Grossly Normal] : speech grossly normal [Normal Affect] : the affect was normal [Alert and Oriented x3] : oriented to person, place, and time [Normal Insight/Judgement] : insight and judgment were intact [de-identified] : decreased breath sounds b/l  [de-identified] : chronic non pitting edema

## 2021-02-09 LAB
ALBUMIN SERPL ELPH-MCNC: 4.4 G/DL
ALP BLD-CCNC: 46 U/L
ALT SERPL-CCNC: 30 U/L
ANION GAP SERPL CALC-SCNC: 13 MMOL/L
AST SERPL-CCNC: 15 U/L
BILIRUB SERPL-MCNC: 0.3 MG/DL
BUN SERPL-MCNC: 18 MG/DL
CALCIUM SERPL-MCNC: 9.8 MG/DL
CHLORIDE SERPL-SCNC: 103 MMOL/L
CO2 SERPL-SCNC: 25 MMOL/L
CREAT SERPL-MCNC: 0.62 MG/DL
GLUCOSE SERPL-MCNC: 104 MG/DL
POTASSIUM SERPL-SCNC: 4.5 MMOL/L
PROT SERPL-MCNC: 7.3 G/DL
SODIUM SERPL-SCNC: 141 MMOL/L

## 2021-02-16 ENCOUNTER — APPOINTMENT (OUTPATIENT)
Dept: INTERNAL MEDICINE | Facility: CLINIC | Age: 51
End: 2021-02-16
Payer: COMMERCIAL

## 2021-02-16 VITALS
RESPIRATION RATE: 12 BRPM | HEIGHT: 62 IN | TEMPERATURE: 97.7 F | OXYGEN SATURATION: 95 % | DIASTOLIC BLOOD PRESSURE: 87 MMHG | WEIGHT: 254 LBS | HEART RATE: 95 BPM | SYSTOLIC BLOOD PRESSURE: 139 MMHG | BODY MASS INDEX: 46.74 KG/M2

## 2021-02-16 VITALS — SYSTOLIC BLOOD PRESSURE: 128 MMHG | DIASTOLIC BLOOD PRESSURE: 84 MMHG

## 2021-02-16 LAB
CHOLEST SERPL-MCNC: 268 MG/DL
HDLC SERPL-MCNC: 62 MG/DL
LDLC SERPL CALC-MCNC: 159 MG/DL
NONHDLC SERPL-MCNC: 206 MG/DL
SARS-COV-2 IGG SERPL IA-ACNC: 25.1 INDEX
SARS-COV-2 IGG SERPL QL IA: POSITIVE
TRIGL SERPL-MCNC: 231 MG/DL

## 2021-02-16 PROCEDURE — 99214 OFFICE O/P EST MOD 30 MIN: CPT

## 2021-02-16 PROCEDURE — 99072 ADDL SUPL MATRL&STAF TM PHE: CPT

## 2021-02-16 NOTE — PHYSICAL EXAM
Subjective     Freddy Menendez is a 55 year old male who presents to clinic today for the following health issues:    Patient is fasting.     History of Present Illness        Hyperlipidemia:  He presents for follow up of hyperlipidemia.  He is taking medication to lower cholesterol. He is not having myalgia or other side effects to statin medications.He is not reporting shortness of breath, increased sweating or nausea with activity, left-sided neck or arm pain, chest pain or pressure, or pain in calves when walking 1-2 blocks.    Hypertension: He presents for follow up of hypertension.  He does not check blood pressure  regularly outside of the clinic. Outpatient blood pressures have not been over 140/90. He does not follow a low salt diet.     He eats 2-3 servings of fruits and vegetables daily.He consumes 0 sweetened beverage(s) daily.  He is taking medications regularly.       -------------------------------------    Patient Active Problem List   Diagnosis     Hemorrhage     Seasonal allergies     Hypercholesterolemia     Colon polyps     HYPERLIPIDEMIA LDL GOAL <130     Gout     Male perineal pain     Intermittent asthma     Erectile dysfunction     Overweight (BMI 25.0-29.9)     Acute right-sided low back pain with right-sided sciatica     Benign essential hypertension     Pulmonary nodules     Melanocytic nevus of trunk     Past Surgical History:   Procedure Laterality Date     ARTHROSCOPY KNEE RT/LT      1985     COLONOSCOPY  7-    colon polyps       Social History     Tobacco Use     Smoking status: Never Smoker     Smokeless tobacco: Never Used   Substance Use Topics     Alcohol use: Yes     Alcohol/week: 3.0 oz     Types: 6 Standard drinks or equivalent per week     Comment: 2x per week sometimes     Family History   Problem Relation Age of Onset     Hypertension Mother      Cancer - colorectal Father 50     Prostate Cancer Father          mid 70's     Cancer Paternal Grandfather       "      Reviewed and updated as needed this visit by Provider  Tobacco  Allergies  Meds  Problems  Med Hx  Surg Hx  Fam Hx         Review of Systems   Constitutional, HEENT, cardiovascular, pulmonary, GI, , musculoskeletal, neuro, skin, endocrine and psych systems are negative, except as in HPI or otherwise noted         Objective    /62 (BP Location: Right arm, Patient Position: Chair, Cuff Size: Adult Regular)   Pulse 62   Temp 97.6  F (36.4  C) (Temporal)   Resp 16   Ht 1.689 m (5' 6.5\")   Wt 83.9 kg (185 lb)   SpO2 98%   BMI 29.41 kg/m    Body mass index is 29.41 kg/m .  Physical Exam   GENERAL: healthy, alert and no distress  EYES: Eyes grossly normal to inspection, PERRL and conjunctivae and sclerae normal  HENT: ear canals and TM's normal, nose and mouth without ulcers or lesions  NECK: no adenopathy, no asymmetry, masses, or scars and thyroid normal to palpation  RESP: lungs clear to auscultation - no rales, rhonchi or wheezes  CV: regular rate and rhythm, normal S1 S2, no S3 or S4, no murmur, click or rub, no peripheral edema and peripheral pulses strong  ABDOMEN: soft, nontender, no hepatosplenomegaly, no masses and bowel sounds normal  MS: no gross musculoskeletal defects noted, no edema  SKIN: no suspicious lesions or rashes  NEURO: Normal strength and tone, mentation intact and speech normal  PSYCH: mentation appears normal, affect normal/bright            Assessment & Plan       ICD-10-CM    1. Benign essential hypertension I10    2. Hyperlipidemia LDL goal <130 E78.5 Lipid panel reflex to direct LDL Fasting   3. Overweight (BMI 25.0-29.9) E66.3      Blood pressure is well controlled at this time. Plan to stop lisinopril. Discussed weight and exercise need to be worked on to continue without meds. Encouraged regular work out plan and plans to return, but has been off of this again lately. Checking choelsterol and will update meds based on response.         BMI:   Estimated body mass " "index is 29.41 kg/m  as calculated from the following:    Height as of this encounter: 1.689 m (5' 6.5\").    Weight as of this encounter: 83.9 kg (185 lb).   Weight management plan: Discussed healthy diet and exercise guidelines            No follow-ups on file.    Roxanna Hooper MD, MD  [facility]    " [No Acute Distress] : no acute distress [Well Nourished] : well nourished [Well Developed] : well developed [Well-Appearing] : well-appearing [Normal Sclera/Conjunctiva] : normal sclera/conjunctiva [PERRL] : pupils equal round and reactive to light [EOMI] : extraocular movements intact [Normal Outer Ear/Nose] : the outer ears and nose were normal in appearance [Normal Oropharynx] : the oropharynx was normal [No JVD] : no jugular venous distention [No Lymphadenopathy] : no lymphadenopathy [Supple] : supple [Thyroid Normal, No Nodules] : the thyroid was normal and there were no nodules present [No Respiratory Distress] : no respiratory distress  [No Accessory Muscle Use] : no accessory muscle use [Clear to Auscultation] : lungs were clear to auscultation bilaterally [Normal Rate] : normal rate  [Regular Rhythm] : with a regular rhythm [Normal S1, S2] : normal S1 and S2 [No Murmur] : no murmur heard [No Carotid Bruits] : no carotid bruits [No Abdominal Bruit] : a ~M bruit was not heard ~T in the abdomen [No Varicosities] : no varicosities [Pedal Pulses Present] : the pedal pulses are present [No Edema] : there was no peripheral edema [No Palpable Aorta] : no palpable aorta [No Extremity Clubbing/Cyanosis] : no extremity clubbing/cyanosis [Soft] : abdomen soft [Non Tender] : non-tender [Non-distended] : non-distended [No Masses] : no abdominal mass palpated [No HSM] : no HSM [Normal Bowel Sounds] : normal bowel sounds [Normal Posterior Cervical Nodes] : no posterior cervical lymphadenopathy [Normal Anterior Cervical Nodes] : no anterior cervical lymphadenopathy [No CVA Tenderness] : no CVA  tenderness [No Spinal Tenderness] : no spinal tenderness [No Joint Swelling] : no joint swelling [Grossly Normal Strength/Tone] : grossly normal strength/tone [No Rash] : no rash [Coordination Grossly Intact] : coordination grossly intact [No Focal Deficits] : no focal deficits [Normal Gait] : normal gait [Deep Tendon Reflexes (DTR)] : deep tendon reflexes were 2+ and symmetric [Normal Affect] : the affect was normal [Normal Insight/Judgement] : insight and judgment were intact

## 2021-02-18 NOTE — ASSESSMENT
[FreeTextEntry1] : HTN\par cont Lasix 20mg and Losartan 100mg daily\par \par Lab results reviewed \par \par high cholesterol and TG\par strict low cholesterol/ low fat diet, weight loss exercise\par we'll recheck lipids in 3 months, if no improvement we'll start meds\par \par COVID ab is positive\par \par CXR- negative\par \par \par

## 2021-02-18 NOTE — HISTORY OF PRESENT ILLNESS
[de-identified] : Ms. KAMARI BARROSO is a 50 year old female, with hx of HTN, presents for follow up on her BP, lab results and CXR results\par She is on Lasix 20mg and Losartan was increased to 100mg daily on her last visit last week \par She reports compliance with taking her meds daily and has been following a low salt diet\par Denies SOB, chest pain, abdominal pain, N/V/D, leg swelling, headache, dizziness \par \par \par \par

## 2021-04-29 ENCOUNTER — NON-APPOINTMENT (OUTPATIENT)
Age: 51
End: 2021-04-29

## 2021-04-29 ENCOUNTER — APPOINTMENT (OUTPATIENT)
Dept: OPHTHALMOLOGY | Facility: CLINIC | Age: 51
End: 2021-04-29
Payer: COMMERCIAL

## 2021-04-29 PROCEDURE — 99072 ADDL SUPL MATRL&STAF TM PHE: CPT

## 2021-04-29 PROCEDURE — 92083 EXTENDED VISUAL FIELD XM: CPT

## 2021-04-29 PROCEDURE — 92012 INTRM OPH EXAM EST PATIENT: CPT

## 2021-05-10 ENCOUNTER — RX RENEWAL (OUTPATIENT)
Age: 51
End: 2021-05-10

## 2021-07-13 ENCOUNTER — APPOINTMENT (OUTPATIENT)
Dept: OBGYN | Facility: CLINIC | Age: 51
End: 2021-07-13

## 2021-07-19 ENCOUNTER — APPOINTMENT (OUTPATIENT)
Dept: INTERNAL MEDICINE | Facility: CLINIC | Age: 51
End: 2021-07-19
Payer: COMMERCIAL

## 2021-07-19 VITALS
OXYGEN SATURATION: 95 % | SYSTOLIC BLOOD PRESSURE: 136 MMHG | HEART RATE: 76 BPM | BODY MASS INDEX: 44.9 KG/M2 | RESPIRATION RATE: 12 BRPM | DIASTOLIC BLOOD PRESSURE: 84 MMHG | WEIGHT: 244 LBS | TEMPERATURE: 97.1 F | HEIGHT: 62 IN

## 2021-07-19 PROCEDURE — 99214 OFFICE O/P EST MOD 30 MIN: CPT

## 2021-07-31 LAB
ALBUMIN SERPL ELPH-MCNC: 4.3 G/DL
ALP BLD-CCNC: 61 U/L
ALT SERPL-CCNC: 13 U/L
ANION GAP SERPL CALC-SCNC: 11 MMOL/L
AST SERPL-CCNC: 13 U/L
BILIRUB SERPL-MCNC: 0.3 MG/DL
BUN SERPL-MCNC: 16 MG/DL
CALCIUM SERPL-MCNC: 9.4 MG/DL
CHLORIDE SERPL-SCNC: 107 MMOL/L
CO2 SERPL-SCNC: 23 MMOL/L
COVID-19 NUCLEOCAPSID  GAM ANTIBODY INTERPRETATION: POSITIVE
COVID-19 SPIKE DOMAIN ANTIBODY INTERPRETATION: POSITIVE
CREAT SERPL-MCNC: 0.79 MG/DL
GLUCOSE SERPL-MCNC: 116 MG/DL
POTASSIUM SERPL-SCNC: 3.7 MMOL/L
PROT SERPL-MCNC: 7.4 G/DL
SARS-COV-2 AB SERPL IA-ACNC: >250 U/ML
SARS-COV-2 AB SERPL QL IA: 25.7 INDEX
SODIUM SERPL-SCNC: 141 MMOL/L

## 2021-07-31 NOTE — HISTORY OF PRESENT ILLNESS
[de-identified] : Ms. KAMARI BARROSO is a 50 year old female, with hx of HTN, presents for follow up on her BP and blood work to check her cholesterol\par She is on Lasix 20mg and Losartan was increased to 100mg daily on her last visit last week \par She reports compliance with taking her meds daily and has been following a low salt diet\par Denies SOB, chest pain, abdominal pain, N/V/D, leg swelling, headache, dizziness \par \par \par \par

## 2021-07-31 NOTE — ASSESSMENT
[FreeTextEntry1] : \par \par HTN\par cont Lasix 20mg and Losartan 100mg daily\par \par high cholesterol and TG\par discussed importance of following a low cholesterol/low fat diet\par we'll check Lipid panel today\par \par pt to f/u in one week for lab results\par \par \par \par \par \par \par

## 2021-08-07 NOTE — PLAN
[FreeTextEntry1] : I have discussed my impression and treatment plan with the patient.  All surgical options were discussed including non-surgical treatments.  The patient would like to proceed with laparoscopic Cesar-en-Y gastric bypass vs sleeve gastrectomy.  \par \par Benefits and risks of the surgeries were discussed in depth, particularly leak, bleeding, sepsis, blood clots, ulcer, malnutrition, internal hernia and complications related to adhesions, weight regain, prolonged hospital stay and the remote possibility of death.   Also discussed was the importance of adhering to the recommended nutritional guidelines and supplements and attending regular follow-up.  I reviewed the critical importance of behavioral modification and follow-up in order to optimize outcomes and avoid complications. The patient was given the opportunity to ask pertinent questions and expressed good understanding of the aforementioned issues.\par \par Plan will be for laparoscopic surgery after completion of:\par - medical weight management program\par - upper endoscopy\par - nutritional evaluation\par - medical, pulmonary and cardiac clearance\par - psychological evaluation
Left arm;

## 2021-08-13 ENCOUNTER — TRANSCRIPTION ENCOUNTER (OUTPATIENT)
Age: 51
End: 2021-08-13

## 2021-08-23 ENCOUNTER — APPOINTMENT (OUTPATIENT)
Dept: INTERNAL MEDICINE | Facility: CLINIC | Age: 51
End: 2021-08-23
Payer: COMMERCIAL

## 2021-08-23 VITALS
HEIGHT: 62 IN | RESPIRATION RATE: 12 BRPM | BODY MASS INDEX: 44.9 KG/M2 | DIASTOLIC BLOOD PRESSURE: 82 MMHG | WEIGHT: 244 LBS | TEMPERATURE: 97.3 F | SYSTOLIC BLOOD PRESSURE: 123 MMHG | HEART RATE: 82 BPM | OXYGEN SATURATION: 98 %

## 2021-08-23 PROCEDURE — 99214 OFFICE O/P EST MOD 30 MIN: CPT

## 2021-08-29 LAB
CHOLEST SERPL-MCNC: 243 MG/DL
HDLC SERPL-MCNC: 39 MG/DL
LDLC SERPL CALC-MCNC: 125 MG/DL
NONHDLC SERPL-MCNC: 205 MG/DL
TRIGL SERPL-MCNC: 399 MG/DL

## 2021-08-29 NOTE — HISTORY OF PRESENT ILLNESS
[de-identified] : Ms. KAMARI BARROSO is a 50 year old female, with hx of HTN, presents for follow up visit, Rx renewals, and to discuss lab results\par She is on Lasix 20mg and Losartan 100 mg daily\par She reports compliance with taking her meds daily and has been following a low salt diet\par Denies SOB, chest pain, abdominal pain, N/V/D, leg swelling, headache, dizziness \par \par \par \par

## 2021-08-29 NOTE — ASSESSMENT
[FreeTextEntry1] : \par \par \par lab results reviewed with pt\par \par cholesterol is 243-better than previous\par Tg are 399 \par start fish oil tabs\par discussed importance of following a low cholesterol/low fat diet\par we'll check Lipids in 3 months\par \par HTN\par cont Lasix 20mg and Losartan 100mg daily-renewed today\par \par pt to f/u in one week for lab results\par \par \par \par \par \par \par

## 2021-09-20 ENCOUNTER — TRANSCRIPTION ENCOUNTER (OUTPATIENT)
Age: 51
End: 2021-09-20

## 2021-11-10 ENCOUNTER — TRANSCRIPTION ENCOUNTER (OUTPATIENT)
Age: 51
End: 2021-11-10

## 2021-11-24 ENCOUNTER — APPOINTMENT (OUTPATIENT)
Dept: INTERNAL MEDICINE | Facility: CLINIC | Age: 51
End: 2021-11-24

## 2021-12-10 ENCOUNTER — TRANSCRIPTION ENCOUNTER (OUTPATIENT)
Age: 51
End: 2021-12-10

## 2022-01-13 ENCOUNTER — TRANSCRIPTION ENCOUNTER (OUTPATIENT)
Age: 52
End: 2022-01-13

## 2022-01-31 ENCOUNTER — APPOINTMENT (OUTPATIENT)
Dept: OPHTHALMOLOGY | Facility: CLINIC | Age: 52
End: 2022-01-31
Payer: COMMERCIAL

## 2022-01-31 ENCOUNTER — NON-APPOINTMENT (OUTPATIENT)
Age: 52
End: 2022-01-31

## 2022-01-31 PROCEDURE — 92014 COMPRE OPH EXAM EST PT 1/>: CPT

## 2022-02-07 ENCOUNTER — APPOINTMENT (OUTPATIENT)
Dept: INTERNAL MEDICINE | Facility: CLINIC | Age: 52
End: 2022-02-07
Payer: COMMERCIAL

## 2022-02-07 VITALS
HEIGHT: 62 IN | TEMPERATURE: 96.3 F | BODY MASS INDEX: 43.43 KG/M2 | RESPIRATION RATE: 12 BRPM | OXYGEN SATURATION: 100 % | HEART RATE: 71 BPM | SYSTOLIC BLOOD PRESSURE: 124 MMHG | DIASTOLIC BLOOD PRESSURE: 81 MMHG | WEIGHT: 236 LBS

## 2022-02-07 DIAGNOSIS — R51.9 HEADACHE, UNSPECIFIED: ICD-10-CM

## 2022-02-07 PROCEDURE — 99214 OFFICE O/P EST MOD 30 MIN: CPT

## 2022-02-07 RX ORDER — PHENTERMINE AND TOPIRAMATE 3.75; 23 MG/1; MG/1
3.75-23 CAPSULE, EXTENDED RELEASE ORAL
Qty: 30 | Refills: 0 | Status: DISCONTINUED | COMMUNITY
Start: 2020-09-30 | End: 2022-02-07

## 2022-02-07 RX ORDER — DEXAMETHASONE 6 MG/1
6 TABLET ORAL DAILY
Qty: 10 | Refills: 0 | Status: DISCONTINUED | COMMUNITY
Start: 2021-01-13 | End: 2022-02-07

## 2022-02-07 RX ORDER — APIXABAN 5 MG/1
5 TABLET, FILM COATED ORAL
Qty: 20 | Refills: 0 | Status: DISCONTINUED | COMMUNITY
Start: 2021-01-13 | End: 2022-02-07

## 2022-02-14 NOTE — PHYSICAL EXAM
[No Acute Distress] : no acute distress [Well Nourished] : well nourished [Well Developed] : well developed [Well-Appearing] : well-appearing [Normal Sclera/Conjunctiva] : normal sclera/conjunctiva [PERRL] : pupils equal round and reactive to light [EOMI] : extraocular movements intact [Normal Outer Ear/Nose] : the outer ears and nose were normal in appearance [Normal Oropharynx] : the oropharynx was normal [No JVD] : no jugular venous distention [No Lymphadenopathy] : no lymphadenopathy [Supple] : supple [Thyroid Normal, No Nodules] : the thyroid was normal and there were no nodules present [No Respiratory Distress] : no respiratory distress  [No Accessory Muscle Use] : no accessory muscle use [Clear to Auscultation] : lungs were clear to auscultation bilaterally [Normal Rate] : normal rate  [Regular Rhythm] : with a regular rhythm [Normal S1, S2] : normal S1 and S2 [No Murmur] : no murmur heard [Soft] : abdomen soft [Non Tender] : non-tender [Non-distended] : non-distended [No Masses] : no abdominal mass palpated [No HSM] : no HSM [Normal Bowel Sounds] : normal bowel sounds [Normal Posterior Cervical Nodes] : no posterior cervical lymphadenopathy [Normal Anterior Cervical Nodes] : no anterior cervical lymphadenopathy [No CVA Tenderness] : no CVA  tenderness [No Spinal Tenderness] : no spinal tenderness [No Joint Swelling] : no joint swelling [Grossly Normal Strength/Tone] : grossly normal strength/tone [No Rash] : no rash [Coordination Grossly Intact] : coordination grossly intact [No Focal Deficits] : no focal deficits [Normal Gait] : normal gait [Speech Grossly Normal] : speech grossly normal [Normal Affect] : the affect was normal [Alert and Oriented x3] : oriented to person, place, and time [Normal Insight/Judgement] : insight and judgment were intact [de-identified] : chronic non pitting edema

## 2022-02-14 NOTE — ASSESSMENT
[FreeTextEntry1] : \par \par HTN\par cont Losartan 100mg -renewed today\par cont lasix 20mg daily-renewed today\par \par HLD\par pt stopped fish oils but did not agree with her\par we'll check lipid panel today\par \par GERD\par cont omeprazole 40mg prn-renewed today\par \par Obesity\par on Ozempic 0.5mg- renewed today\par \par migraine\par cont sumatriptan-renewed today\par \par blood work ordered\par \par pt to f/u in one week for lab results\par \par \par \par \par \par \par

## 2022-02-14 NOTE — HISTORY OF PRESENT ILLNESS
[de-identified] : \par Ms. KAMARI BARROSO is a 50 year old female, with hx of HTN, HLD, presents for follow up visit, Rx renewals\par She is on Lasix 20mg and Losartan 100 mg daily\par She reports compliance with taking her meds daily and has been following a low salt diet\par Denies SOB, chest pain, abdominal pain, N/V/D, leg swelling, headache, dizziness \par \par \par \par

## 2022-02-17 ENCOUNTER — APPOINTMENT (OUTPATIENT)
Dept: OPHTHALMOLOGY | Facility: CLINIC | Age: 52
End: 2022-02-17
Payer: COMMERCIAL

## 2022-02-17 ENCOUNTER — NON-APPOINTMENT (OUTPATIENT)
Age: 52
End: 2022-02-17

## 2022-02-17 PROCEDURE — 92014 COMPRE OPH EXAM EST PT 1/>: CPT

## 2022-02-17 PROCEDURE — 92250 FUNDUS PHOTOGRAPHY W/I&R: CPT

## 2022-02-20 LAB
ALBUMIN SERPL ELPH-MCNC: 4.1 G/DL
ALP BLD-CCNC: 47 U/L
ALT SERPL-CCNC: 12 U/L
ANION GAP SERPL CALC-SCNC: 19 MMOL/L
AST SERPL-CCNC: 18 U/L
BILIRUB SERPL-MCNC: 0.4 MG/DL
BUN SERPL-MCNC: 17 MG/DL
CALCIUM SERPL-MCNC: 9.5 MG/DL
CHLORIDE SERPL-SCNC: 104 MMOL/L
CO2 SERPL-SCNC: 16 MMOL/L
CREAT SERPL-MCNC: 0.78 MG/DL
ESTIMATED AVERAGE GLUCOSE: 97 MG/DL
GLUCOSE SERPL-MCNC: 66 MG/DL
HBA1C MFR BLD HPLC: 5 %
POTASSIUM SERPL-SCNC: 4.3 MMOL/L
PROT SERPL-MCNC: 6.9 G/DL
SODIUM SERPL-SCNC: 138 MMOL/L

## 2022-03-02 LAB
CHOLEST SERPL-MCNC: 196 MG/DL
HDLC SERPL-MCNC: 44 MG/DL
LDLC SERPL CALC-MCNC: 122 MG/DL
NONHDLC SERPL-MCNC: 152 MG/DL
TRIGL SERPL-MCNC: 152 MG/DL

## 2022-03-14 NOTE — HISTORY OF PRESENT ILLNESS
Patient: Maryana Jansen    Procedure Summary     Date: 03/14/22 Room / Location:  [facility]    Anesthesia Start: 0658 Anesthesia Stop: 0821    Procedures:       RIGHT CHEVRON/AKIN BUNIONECTOMY, (Right Foot)      2ND HAMMERTOE CORRECTION, CORRECTION OF ANGULAR DEFORMITY, WEIL OSTEOTOMY (Right Foot) Diagnosis:     Surgeons: Ilya Yung Jr., MD Provider: David Smith MD    Anesthesia Type: general with block ASA Status: 2          Anesthesia Type: general with block    Vitals  Vitals Value Taken Time   /78 03/14/22 0900   Temp 36.3 °C (97.4 °F) 03/14/22 0900   Pulse 65 03/14/22 0900   Resp 16 03/14/22 0900   SpO2 100 % 03/14/22 0900           Post Anesthesia Care and Evaluation    Patient location during evaluation: bedside  Patient participation: complete - patient participated  Level of consciousness: awake and alert  Pain management: adequate  Airway patency: patent  Anesthetic complications: No anesthetic complications  PONV Status: controlled  Cardiovascular status: acceptable  Respiratory status: acceptable  Hydration status: acceptable      
[de-identified] : 48 yo female, with PMHx of HTN, hypercholesterolemia, presents for annual physical\par She is doing well. She is still on weight watchers and has lost a total 70lbs

## 2022-03-17 ENCOUNTER — APPOINTMENT (OUTPATIENT)
Dept: INTERNAL MEDICINE | Facility: CLINIC | Age: 52
End: 2022-03-17

## 2022-03-28 ENCOUNTER — APPOINTMENT (OUTPATIENT)
Dept: OBGYN | Facility: CLINIC | Age: 52
End: 2022-03-28

## 2022-04-11 ENCOUNTER — TRANSCRIPTION ENCOUNTER (OUTPATIENT)
Age: 52
End: 2022-04-11

## 2022-05-25 ENCOUNTER — APPOINTMENT (OUTPATIENT)
Dept: ORTHOPEDIC SURGERY | Facility: CLINIC | Age: 52
End: 2022-05-25
Payer: COMMERCIAL

## 2022-05-25 VITALS
SYSTOLIC BLOOD PRESSURE: 139 MMHG | HEART RATE: 70 BPM | WEIGHT: 235 LBS | HEIGHT: 62 IN | BODY MASS INDEX: 43.24 KG/M2 | DIASTOLIC BLOOD PRESSURE: 80 MMHG

## 2022-05-25 DIAGNOSIS — M47.817 SPONDYLOSIS W/OUT MYELOPATHY OR RADICULOPATHY, LUMBOSACRAL REGION: ICD-10-CM

## 2022-05-25 DIAGNOSIS — M54.50 LOW BACK PAIN, UNSPECIFIED: ICD-10-CM

## 2022-05-25 DIAGNOSIS — M41.9 SCOLIOSIS, UNSPECIFIED: ICD-10-CM

## 2022-05-25 PROCEDURE — 72082 X-RAY EXAM ENTIRE SPI 2/3 VW: CPT

## 2022-05-25 PROCEDURE — 99203 OFFICE O/P NEW LOW 30 MIN: CPT

## 2022-05-25 NOTE — HISTORY OF PRESENT ILLNESS
[de-identified] : Ms. KAMARI PASTOR  is a 51 year old female who presents with 2 weeks of right hip/lumbar pain without any specific cause or trauma.  She can get some pain in her right thigh.  Denies any LE radicular symptoms.  Normal bowel and bladder control.   Denies any recent fevers, chills, sweats, weight loss, or infection.  She was told she had a spine curvature when pregnant since an epidural could not be done.  She is going to see a hip MD next week. \par \par The patients past medical history, past surgical history, medications, allergies, and social history were reviewed by me today with the patient and documented accordingly.  In addition, the patient's family history, which is noncontributory to their visit, was also reviewed.\par

## 2022-05-25 NOTE — PHYSICAL EXAM
[Antalgic] : antalgic [de-identified] : Examination of the lumbar spine reveals no midline tenderness palpation, step-offs, or skin lesions. Decreased range of motion with respect to flexion, extension, lateral bending, and rotation. No tenderness to palpation of the sciatic notch. No tenderness palpation of the bilateral greater trochanters.  She does have some pain with passive range of motion of her right hip which reproduces some of her symptoms. No instability of bilateral lower extremities.  Negative MIGUEL. Negative straight leg raise bilaterally. No bowstring. Negative femoral stretch. 5 out of 5 iliopsoas, hip abductors, hips adductors, quadriceps, hamstrings, gastrocsoleus, tibialis anterior, extensor hallucis longus, peroneals. Grossly intact sensation to light touch bilateral lower extremities. 1+ patellar and Achilles reflexes. Downgoing Babinski. No clonus. Intact proprioception. Palpable pulses. No skin lesion and no edema on the right and left lower extremities. [de-identified] : AP lateral scoliosis x-rays reveals mild scoliosis and spondylosis

## 2022-05-25 NOTE — DISCUSSION/SUMMARY
[de-identified] : We discussed further treatment options.  She will try course of physical therapy.  She also be seeing someone regarding her hip.  She will let me know of any changes or worsening of her symptoms

## 2022-06-14 ENCOUNTER — APPOINTMENT (OUTPATIENT)
Dept: ORTHOPEDIC SURGERY | Facility: CLINIC | Age: 52
End: 2022-06-14

## 2022-06-28 ENCOUNTER — APPOINTMENT (OUTPATIENT)
Dept: OBGYN | Facility: CLINIC | Age: 52
End: 2022-06-28

## 2022-06-29 ENCOUNTER — APPOINTMENT (OUTPATIENT)
Dept: PULMONOLOGY | Facility: CLINIC | Age: 52
End: 2022-06-29

## 2022-06-29 DIAGNOSIS — Z86.79 PERSONAL HISTORY OF OTHER DISEASES OF THE CIRCULATORY SYSTEM: ICD-10-CM

## 2022-06-29 DIAGNOSIS — Z82.49 FAMILY HISTORY OF ISCHEMIC HEART DISEASE AND OTHER DISEASES OF THE CIRCULATORY SYSTEM: ICD-10-CM

## 2022-06-29 DIAGNOSIS — K21.9 GASTRO-ESOPHAGEAL REFLUX DISEASE W/OUT ESOPHAGITIS: ICD-10-CM

## 2022-06-29 DIAGNOSIS — R53.83 OTHER FATIGUE: ICD-10-CM

## 2022-06-29 PROCEDURE — 99213 OFFICE O/P EST LOW 20 MIN: CPT | Mod: 95

## 2022-07-15 ENCOUNTER — TRANSCRIPTION ENCOUNTER (OUTPATIENT)
Age: 52
End: 2022-07-15

## 2022-07-20 ENCOUNTER — TRANSCRIPTION ENCOUNTER (OUTPATIENT)
Age: 52
End: 2022-07-20

## 2022-07-25 NOTE — HISTORY OF PRESENT ILLNESS
[Never] : never [TextBox_4] : Patient is a 51-year-old female with obesity, hyperlipidemia who is seen via telehealth.  The patient was recently found to be COVID-19.  She denies fevers chills chest pain weight loss or hemoptysis

## 2022-07-25 NOTE — ASSESSMENT
[FreeTextEntry1] : In summary the patient is a 51-year-old female status post COVID-19 seen via telehealth.  The patient has improved and is asymptomatic at this time.  Tylenol for fever as recommended and lateral decubitus positioning as needed.  Patient is instructed to follow-up with her PMD

## 2022-09-09 ENCOUNTER — APPOINTMENT (OUTPATIENT)
Dept: ORTHOPEDIC SURGERY | Facility: CLINIC | Age: 52
End: 2022-09-09
Payer: COMMERCIAL

## 2022-09-09 VITALS — HEIGHT: 62 IN | WEIGHT: 235 LBS | BODY MASS INDEX: 43.24 KG/M2

## 2022-09-09 DIAGNOSIS — M16.11 UNILATERAL PRIMARY OSTEOARTHRITIS, RIGHT HIP: ICD-10-CM

## 2022-09-09 DIAGNOSIS — M70.61 TROCHANTERIC BURSITIS, RIGHT HIP: ICD-10-CM

## 2022-09-09 PROCEDURE — 99215 OFFICE O/P EST HI 40 MIN: CPT | Mod: 25

## 2022-09-09 PROCEDURE — 20610 DRAIN/INJ JOINT/BURSA W/O US: CPT | Mod: RT

## 2022-09-09 PROCEDURE — 73502 X-RAY EXAM HIP UNI 2-3 VIEWS: CPT | Mod: RT

## 2023-02-16 ENCOUNTER — NON-APPOINTMENT (OUTPATIENT)
Age: 53
End: 2023-02-16

## 2023-02-16 ENCOUNTER — APPOINTMENT (OUTPATIENT)
Dept: OBGYN | Facility: CLINIC | Age: 53
End: 2023-02-16
Payer: COMMERCIAL

## 2023-02-16 VITALS
DIASTOLIC BLOOD PRESSURE: 87 MMHG | HEIGHT: 62 IN | WEIGHT: 240 LBS | SYSTOLIC BLOOD PRESSURE: 146 MMHG | HEART RATE: 72 BPM | BODY MASS INDEX: 44.16 KG/M2

## 2023-02-16 DIAGNOSIS — Z87.42 PERSONAL HISTORY OF OTHER DISEASES OF THE FEMALE GENITAL TRACT: ICD-10-CM

## 2023-02-16 DIAGNOSIS — J12.82 COVID-19: ICD-10-CM

## 2023-02-16 DIAGNOSIS — U07.1 COVID-19: ICD-10-CM

## 2023-02-16 DIAGNOSIS — Z87.898 PERSONAL HISTORY OF OTHER SPECIFIED CONDITIONS: ICD-10-CM

## 2023-02-16 DIAGNOSIS — R31.21 ASYMPTOMATIC MICROSCOPIC HEMATURIA: ICD-10-CM

## 2023-02-16 DIAGNOSIS — Z23 ENCOUNTER FOR IMMUNIZATION: ICD-10-CM

## 2023-02-16 PROCEDURE — 99396 PREV VISIT EST AGE 40-64: CPT

## 2023-02-16 RX ORDER — OMEPRAZOLE 40 MG/1
40 CAPSULE, DELAYED RELEASE ORAL
Qty: 30 | Refills: 3 | Status: DISCONTINUED | COMMUNITY
Start: 2020-08-20 | End: 2023-02-16

## 2023-02-16 NOTE — PHYSICAL EXAM
[Chaperone Present] : A chaperone was present in the examining room during all aspects of the physical examination [Appropriately responsive] : appropriately responsive [Alert] : alert [No Acute Distress] : no acute distress [Soft] : soft [Non-tender] : non-tender [No Lesions] : no lesions [Oriented x3] : oriented x3 [FreeTextEntry7] : rotund [Examination Of The Breasts] : a normal appearance [No Discharge] : no discharge [No Masses] : no breast masses were palpable [Labia Majora] : normal [Labia Minora] : normal [Cystocele] : a cystocele [No Bleeding] : There was no active vaginal bleeding [Normal] : normal [Tenderness] : nontender [Uterine Adnexae] : non-palpable

## 2023-02-16 NOTE — HISTORY OF PRESENT ILLNESS
[Mammogramdate] : May 2021 [TextBox_19] : BIRAD 2 [PapSmeardate] : 2018 [TextBox_31] : neg pap and HPV [ColonoscopyDate] : Feb 2020 [TextBox_43] : repeat 5-10 years

## 2023-02-17 LAB — HPV HIGH+LOW RISK DNA PNL CVX: NOT DETECTED

## 2023-02-22 LAB — CYTOLOGY CVX/VAG DOC THIN PREP: NORMAL

## 2023-03-07 ENCOUNTER — APPOINTMENT (OUTPATIENT)
Dept: INTERNAL MEDICINE | Facility: CLINIC | Age: 53
End: 2023-03-07

## 2023-03-16 ENCOUNTER — APPOINTMENT (OUTPATIENT)
Dept: INTERNAL MEDICINE | Facility: CLINIC | Age: 53
End: 2023-03-16
Payer: COMMERCIAL

## 2023-03-16 VITALS
SYSTOLIC BLOOD PRESSURE: 131 MMHG | RESPIRATION RATE: 14 BRPM | HEART RATE: 74 BPM | BODY MASS INDEX: 44.08 KG/M2 | DIASTOLIC BLOOD PRESSURE: 85 MMHG | WEIGHT: 241 LBS | OXYGEN SATURATION: 97 % | TEMPERATURE: 97.1 F

## 2023-03-16 DIAGNOSIS — Z00.00 ENCOUNTER FOR GENERAL ADULT MEDICAL EXAMINATION W/OUT ABNORMAL FINDINGS: ICD-10-CM

## 2023-03-16 PROCEDURE — 99396 PREV VISIT EST AGE 40-64: CPT

## 2023-03-16 NOTE — PHYSICAL EXAM
[No Acute Distress] : no acute distress [Well-Appearing] : well-appearing [Normal Sclera/Conjunctiva] : normal sclera/conjunctiva [EOMI] : extraocular movements intact [Normal Outer Ear/Nose] : the outer ears and nose were normal in appearance [Normal Oropharynx] : the oropharynx was normal [No JVD] : no jugular venous distention [No Lymphadenopathy] : no lymphadenopathy [Supple] : supple [Thyroid Normal, No Nodules] : the thyroid was normal and there were no nodules present [No Respiratory Distress] : no respiratory distress  [No Accessory Muscle Use] : no accessory muscle use [Clear to Auscultation] : lungs were clear to auscultation bilaterally [Normal Rate] : normal rate  [Regular Rhythm] : with a regular rhythm [Normal S1, S2] : normal S1 and S2 [No Murmur] : no murmur heard [No Carotid Bruits] : no carotid bruits [No Varicosities] : no varicosities [Pedal Pulses Present] : the pedal pulses are present [No Edema] : there was no peripheral edema [No Extremity Clubbing/Cyanosis] : no extremity clubbing/cyanosis [Soft] : abdomen soft [Non Tender] : non-tender [Non-distended] : non-distended [No Masses] : no abdominal mass palpated [No HSM] : no HSM [Normal Bowel Sounds] : normal bowel sounds [Normal Posterior Cervical Nodes] : no posterior cervical lymphadenopathy [No CVA Tenderness] : no CVA  tenderness [Normal Anterior Cervical Nodes] : no anterior cervical lymphadenopathy [No Spinal Tenderness] : no spinal tenderness [No Joint Swelling] : no joint swelling [Grossly Normal Strength/Tone] : grossly normal strength/tone [No Rash] : no rash [Coordination Grossly Intact] : coordination grossly intact [No Focal Deficits] : no focal deficits [Normal Gait] : normal gait [Normal Affect] : the affect was normal [Normal Insight/Judgement] : insight and judgment were intact

## 2023-04-13 LAB
25(OH)D3 SERPL-MCNC: 28.1 NG/ML
ALBUMIN SERPL ELPH-MCNC: 5.1 G/DL
ALP BLD-CCNC: 62 U/L
ALT SERPL-CCNC: 23 U/L
ANION GAP SERPL CALC-SCNC: 20 MMOL/L
APPEARANCE: CLEAR
AST SERPL-CCNC: 16 U/L
BACTERIA: NEGATIVE
BASOPHILS # BLD AUTO: 0.03 K/UL
BASOPHILS NFR BLD AUTO: 0.3 %
BILIRUB SERPL-MCNC: 0.6 MG/DL
BILIRUBIN URINE: NEGATIVE
BLOOD URINE: NEGATIVE
BUN SERPL-MCNC: 17 MG/DL
CALCIUM SERPL-MCNC: 10.2 MG/DL
CHLORIDE SERPL-SCNC: 99 MMOL/L
CO2 SERPL-SCNC: 19 MMOL/L
COLOR: NORMAL
COVID-19 NUCLEOCAPSID  GAM ANTIBODY INTERPRETATION: POSITIVE
COVID-19 SPIKE DOMAIN ANTIBODY INTERPRETATION: POSITIVE
CREAT SERPL-MCNC: 0.7 MG/DL
EGFR: 103 ML/MIN/1.73M2
EOSINOPHIL # BLD AUTO: 0.07 K/UL
EOSINOPHIL NFR BLD AUTO: 0.7 %
ESTIMATED AVERAGE GLUCOSE: 100 MG/DL
GLUCOSE QUALITATIVE U: NEGATIVE
GLUCOSE SERPL-MCNC: 81 MG/DL
HBA1C MFR BLD HPLC: 5.1 %
HCT VFR BLD CALC: 44.8 %
HGB BLD-MCNC: 15.5 G/DL
HYALINE CASTS: 0 /LPF
IMM GRANULOCYTES NFR BLD AUTO: 0.3 %
KETONES URINE: NEGATIVE
LEUKOCYTE ESTERASE URINE: NEGATIVE
LYMPHOCYTES # BLD AUTO: 2.56 K/UL
LYMPHOCYTES NFR BLD AUTO: 27.4 %
MAN DIFF?: NORMAL
MCHC RBC-ENTMCNC: 30 PG
MCHC RBC-ENTMCNC: 34.6 GM/DL
MCV RBC AUTO: 86.7 FL
MICROSCOPIC-UA: NORMAL
MONOCYTES # BLD AUTO: 0.59 K/UL
MONOCYTES NFR BLD AUTO: 6.3 %
NEUTROPHILS # BLD AUTO: 6.08 K/UL
NEUTROPHILS NFR BLD AUTO: 65 %
NITRITE URINE: NEGATIVE
PH URINE: 6
PLATELET # BLD AUTO: 251 K/UL
POTASSIUM SERPL-SCNC: 4.5 MMOL/L
PROT SERPL-MCNC: 7.5 G/DL
PROTEIN URINE: NEGATIVE
RBC # BLD: 5.17 M/UL
RBC # FLD: 13.2 %
RED BLOOD CELLS URINE: 1 /HPF
SARS-COV-2 AB SERPL IA-ACNC: >250 U/ML
SARS-COV-2 AB SERPL QL IA: 47.8 INDEX
SODIUM SERPL-SCNC: 139 MMOL/L
SPECIFIC GRAVITY URINE: 1.01
SQUAMOUS EPITHELIAL CELLS: 0 /HPF
TSH SERPL-ACNC: 1.4 UIU/ML
UROBILINOGEN URINE: NORMAL
VIT B12 SERPL-MCNC: 315 PG/ML
WBC # FLD AUTO: 9.36 K/UL
WHITE BLOOD CELLS URINE: 1 /HPF

## 2023-04-13 NOTE — HEALTH RISK ASSESSMENT
[Good] : ~his/her~  mood as  good [No] : No [0] : 2) Feeling down, depressed, or hopeless: Not at all (0) [PHQ-2 Negative - No further assessment needed] : PHQ-2 Negative - No further assessment needed [Patient reported mammogram was normal] : Patient reported mammogram was normal [Patient reported PAP Smear was normal] : Patient reported PAP Smear was normal [None] : None [Never] : Never [MammogramDate] : 5/2022 [MammogramComments] : Has upcoming appt  [PapSmearDate] : 2/2023 [BoneDensityDate] : 2 years ago  [BoneDensityComments] : Has upcoming appt  [ColonoscopyDate] : 2020 [ColonoscopyComments] : Polyps

## 2023-04-13 NOTE — ASSESSMENT
[FreeTextEntry1] : Physical \par \par UTD with screenings \par \par \par HTN\par cont Losartan 100 mg daily \par cont Lasix 20 mg daily\par discussed importance of following a low salt diet, weight loss, exercise\par \par History of Migraine headaches\par cont Sumatriptan 20 mg PRN \par \par Obesity \par Weight management, Healthy food choices, and increased physical active discussed \par cont Ozempic 0.5 mg weekly \par \par Labs ordered pt to follow-up in 1 week for results

## 2023-04-13 NOTE — HISTORY OF PRESENT ILLNESS
[de-identified] : Ms. KAMARI BARROSO is a 52  year old female, with hx of HTN, HLD, presents for physical \par \par \par She feels well \par She admits diet and exercise could be better \par Denies SOB, chest pain, abdominal pain, N/V/D, leg swelling\par \par \par

## 2023-04-18 ENCOUNTER — APPOINTMENT (OUTPATIENT)
Dept: OBGYN | Facility: CLINIC | Age: 53
End: 2023-04-18

## 2023-04-19 LAB
CHOLEST SERPL-MCNC: 262 MG/DL
HDLC SERPL-MCNC: 68 MG/DL
LDLC SERPL CALC-MCNC: 165 MG/DL
NONHDLC SERPL-MCNC: 194 MG/DL
TRIGL SERPL-MCNC: 145 MG/DL

## 2023-04-27 ENCOUNTER — APPOINTMENT (OUTPATIENT)
Dept: DERMATOLOGY | Facility: CLINIC | Age: 53
End: 2023-04-27

## 2023-05-09 ENCOUNTER — NON-APPOINTMENT (OUTPATIENT)
Age: 53
End: 2023-05-09

## 2023-06-07 LAB
ALBUMIN SERPL ELPH-MCNC: 4.3 G/DL
ALP BLD-CCNC: 55 U/L
ALT SERPL-CCNC: 18 U/L
ANION GAP SERPL CALC-SCNC: 12 MMOL/L
AST SERPL-CCNC: 15 U/L
BILIRUB SERPL-MCNC: 0.4 MG/DL
BUN SERPL-MCNC: 17 MG/DL
CALCIUM SERPL-MCNC: 9.4 MG/DL
CHLORIDE SERPL-SCNC: 105 MMOL/L
CHOLEST SERPL-MCNC: 173 MG/DL
CO2 SERPL-SCNC: 24 MMOL/L
CREAT SERPL-MCNC: 0.76 MG/DL
EGFR: 94 ML/MIN/1.73M2
GLUCOSE SERPL-MCNC: 90 MG/DL
HDLC SERPL-MCNC: 53 MG/DL
LDLC SERPL CALC-MCNC: 98 MG/DL
NONHDLC SERPL-MCNC: 120 MG/DL
POTASSIUM SERPL-SCNC: 4.9 MMOL/L
PROT SERPL-MCNC: 7 G/DL
SODIUM SERPL-SCNC: 140 MMOL/L
TRIGL SERPL-MCNC: 109 MG/DL

## 2023-06-13 ENCOUNTER — APPOINTMENT (OUTPATIENT)
Dept: INTERNAL MEDICINE | Facility: CLINIC | Age: 53
End: 2023-06-13

## 2023-08-17 ENCOUNTER — APPOINTMENT (OUTPATIENT)
Dept: ORTHOPEDIC SURGERY | Facility: CLINIC | Age: 53
End: 2023-08-17

## 2023-09-13 ENCOUNTER — TRANSCRIPTION ENCOUNTER (OUTPATIENT)
Age: 53
End: 2023-09-13

## 2023-09-21 ENCOUNTER — APPOINTMENT (OUTPATIENT)
Dept: INTERNAL MEDICINE | Facility: CLINIC | Age: 53
End: 2023-09-21
Payer: COMMERCIAL

## 2023-09-21 VITALS
SYSTOLIC BLOOD PRESSURE: 120 MMHG | BODY MASS INDEX: 43.24 KG/M2 | OXYGEN SATURATION: 95 % | HEART RATE: 75 BPM | RESPIRATION RATE: 14 BRPM | DIASTOLIC BLOOD PRESSURE: 88 MMHG | HEIGHT: 62 IN | WEIGHT: 235 LBS

## 2023-09-21 DIAGNOSIS — I10 ESSENTIAL (PRIMARY) HYPERTENSION: ICD-10-CM

## 2023-09-21 DIAGNOSIS — E78.5 HYPERLIPIDEMIA, UNSPECIFIED: ICD-10-CM

## 2023-09-21 DIAGNOSIS — R60.9 EDEMA, UNSPECIFIED: ICD-10-CM

## 2023-09-21 PROCEDURE — 99214 OFFICE O/P EST MOD 30 MIN: CPT

## 2023-09-21 RX ORDER — ATORVASTATIN CALCIUM 10 MG/1
10 TABLET, FILM COATED ORAL
Qty: 90 | Refills: 1 | Status: ACTIVE | COMMUNITY
Start: 2023-04-19 | End: 1900-01-01

## 2023-09-21 RX ORDER — SEMAGLUTIDE 1 MG/.5ML
1 INJECTION, SOLUTION SUBCUTANEOUS
Qty: 1 | Refills: 3 | Status: DISCONTINUED | COMMUNITY
Start: 2023-06-08 | End: 2023-09-21

## 2023-09-21 RX ORDER — DICLOFENAC SODIUM 50 MG/1
50 TABLET, DELAYED RELEASE ORAL
Qty: 20 | Refills: 0 | Status: DISCONTINUED | COMMUNITY
Start: 2022-09-09 | End: 2023-09-21

## 2023-09-25 ENCOUNTER — APPOINTMENT (OUTPATIENT)
Dept: OPHTHALMOLOGY | Facility: CLINIC | Age: 53
End: 2023-09-25

## 2023-10-01 NOTE — REVIEW OF SYSTEMS
CM met with the patient at the bedside and explained role of CM and transition planning. Patient verbalized understanding. Patient stated he lives with his spouse in a private home with no stairs. Independent with ADL's/ambulation/driving/medication and DM management PTA. Denies owning DME or home oxygen PTA. Follows outpatient with Oncologist at [facility]. Denies home care services PTA. CM provided direct contact/resource folder and remains available.   [Negative] : Heme/Lymph

## 2023-10-03 ENCOUNTER — INPATIENT (INPATIENT)
Facility: HOSPITAL | Age: 53
LOS: 0 days | Discharge: HOME | End: 2023-10-04
Attending: STUDENT IN AN ORGANIZED HEALTH CARE EDUCATION/TRAINING PROGRAM | Admitting: STUDENT IN AN ORGANIZED HEALTH CARE EDUCATION/TRAINING PROGRAM
Payer: COMMERCIAL

## 2023-10-03 VITALS
SYSTOLIC BLOOD PRESSURE: 145 MMHG | OXYGEN SATURATION: 100 % | RESPIRATION RATE: 18 BRPM | DIASTOLIC BLOOD PRESSURE: 90 MMHG | HEART RATE: 77 BPM | TEMPERATURE: 98 F

## 2023-10-03 DIAGNOSIS — R10.13 EPIGASTRIC PAIN: ICD-10-CM

## 2023-10-03 DIAGNOSIS — I89.0 LYMPHEDEMA, NOT ELSEWHERE CLASSIFIED: ICD-10-CM

## 2023-10-03 DIAGNOSIS — R11.2 NAUSEA WITH VOMITING, UNSPECIFIED: ICD-10-CM

## 2023-10-03 DIAGNOSIS — D72.829 ELEVATED WHITE BLOOD CELL COUNT, UNSPECIFIED: ICD-10-CM

## 2023-10-03 DIAGNOSIS — Z29.9 ENCOUNTER FOR PROPHYLACTIC MEASURES, UNSPECIFIED: ICD-10-CM

## 2023-10-03 DIAGNOSIS — I10 ESSENTIAL (PRIMARY) HYPERTENSION: ICD-10-CM

## 2023-10-03 LAB
ALBUMIN SERPL ELPH-MCNC: 4.7 G/DL — SIGNIFICANT CHANGE UP (ref 3.3–5)
ALP SERPL-CCNC: 55 U/L — SIGNIFICANT CHANGE UP (ref 40–120)
ALT FLD-CCNC: 17 U/L — SIGNIFICANT CHANGE UP (ref 4–33)
ANION GAP SERPL CALC-SCNC: 16 MMOL/L — HIGH (ref 7–14)
APPEARANCE UR: CLEAR — SIGNIFICANT CHANGE UP
AST SERPL-CCNC: 17 U/L — SIGNIFICANT CHANGE UP (ref 4–32)
BACTERIA # UR AUTO: NEGATIVE /HPF — SIGNIFICANT CHANGE UP
BASOPHILS # BLD AUTO: 0.03 K/UL — SIGNIFICANT CHANGE UP (ref 0–0.2)
BASOPHILS NFR BLD AUTO: 0.3 % — SIGNIFICANT CHANGE UP (ref 0–2)
BILIRUB SERPL-MCNC: 0.5 MG/DL — SIGNIFICANT CHANGE UP (ref 0.2–1.2)
BILIRUB UR-MCNC: NEGATIVE — SIGNIFICANT CHANGE UP
BLOOD GAS VENOUS COMPREHENSIVE RESULT: SIGNIFICANT CHANGE UP
BUN SERPL-MCNC: 17 MG/DL — SIGNIFICANT CHANGE UP (ref 7–23)
C DIFF BY PCR RESULT: SIGNIFICANT CHANGE UP
CALCIUM SERPL-MCNC: 9.8 MG/DL — SIGNIFICANT CHANGE UP (ref 8.4–10.5)
CAST: 0 /LPF — SIGNIFICANT CHANGE UP (ref 0–4)
CHLORIDE SERPL-SCNC: 101 MMOL/L — SIGNIFICANT CHANGE UP (ref 98–107)
CO2 SERPL-SCNC: 20 MMOL/L — LOW (ref 22–31)
COLOR SPEC: YELLOW — SIGNIFICANT CHANGE UP
CREAT SERPL-MCNC: 0.8 MG/DL — SIGNIFICANT CHANGE UP (ref 0.5–1.3)
DIFF PNL FLD: NEGATIVE — SIGNIFICANT CHANGE UP
EGFR: 88 ML/MIN/1.73M2 — SIGNIFICANT CHANGE UP
EOSINOPHIL # BLD AUTO: 0.07 K/UL — SIGNIFICANT CHANGE UP (ref 0–0.5)
EOSINOPHIL NFR BLD AUTO: 0.6 % — SIGNIFICANT CHANGE UP (ref 0–6)
GI PCR PANEL: SIGNIFICANT CHANGE UP
GLUCOSE SERPL-MCNC: 113 MG/DL — HIGH (ref 70–99)
GLUCOSE UR QL: NEGATIVE MG/DL — SIGNIFICANT CHANGE UP
HCG SERPL-ACNC: 3.2 MIU/ML — SIGNIFICANT CHANGE UP
HCT VFR BLD CALC: 43.6 % — SIGNIFICANT CHANGE UP (ref 34.5–45)
HGB BLD-MCNC: 14.9 G/DL — SIGNIFICANT CHANGE UP (ref 11.5–15.5)
IANC: 9.24 K/UL — HIGH (ref 1.8–7.4)
IMM GRANULOCYTES NFR BLD AUTO: 0.3 % — SIGNIFICANT CHANGE UP (ref 0–0.9)
KETONES UR-MCNC: NEGATIVE MG/DL — SIGNIFICANT CHANGE UP
LEUKOCYTE ESTERASE UR-ACNC: NEGATIVE — SIGNIFICANT CHANGE UP
LIDOCAIN IGE QN: 49 U/L — SIGNIFICANT CHANGE UP (ref 7–60)
LYMPHOCYTES # BLD AUTO: 1.84 K/UL — SIGNIFICANT CHANGE UP (ref 1–3.3)
LYMPHOCYTES # BLD AUTO: 15.8 % — SIGNIFICANT CHANGE UP (ref 13–44)
MCHC RBC-ENTMCNC: 29.2 PG — SIGNIFICANT CHANGE UP (ref 27–34)
MCHC RBC-ENTMCNC: 34.2 GM/DL — SIGNIFICANT CHANGE UP (ref 32–36)
MCV RBC AUTO: 85.5 FL — SIGNIFICANT CHANGE UP (ref 80–100)
MONOCYTES # BLD AUTO: 0.45 K/UL — SIGNIFICANT CHANGE UP (ref 0–0.9)
MONOCYTES NFR BLD AUTO: 3.9 % — SIGNIFICANT CHANGE UP (ref 2–14)
NEUTROPHILS # BLD AUTO: 9.24 K/UL — HIGH (ref 1.8–7.4)
NEUTROPHILS NFR BLD AUTO: 79.1 % — HIGH (ref 43–77)
NITRITE UR-MCNC: NEGATIVE — SIGNIFICANT CHANGE UP
NRBC # BLD: 0 /100 WBCS — SIGNIFICANT CHANGE UP (ref 0–0)
NRBC # FLD: 0 K/UL — SIGNIFICANT CHANGE UP (ref 0–0)
PH UR: 5.5 — SIGNIFICANT CHANGE UP (ref 5–8)
PLATELET # BLD AUTO: 270 K/UL — SIGNIFICANT CHANGE UP (ref 150–400)
POTASSIUM SERPL-MCNC: 4.1 MMOL/L — SIGNIFICANT CHANGE UP (ref 3.5–5.3)
POTASSIUM SERPL-SCNC: 4.1 MMOL/L — SIGNIFICANT CHANGE UP (ref 3.5–5.3)
PROT SERPL-MCNC: 7.9 G/DL — SIGNIFICANT CHANGE UP (ref 6–8.3)
PROT UR-MCNC: NEGATIVE MG/DL — SIGNIFICANT CHANGE UP
RBC # BLD: 5.1 M/UL — SIGNIFICANT CHANGE UP (ref 3.8–5.2)
RBC # FLD: 13.2 % — SIGNIFICANT CHANGE UP (ref 10.3–14.5)
RBC CASTS # UR COMP ASSIST: 2 /HPF — SIGNIFICANT CHANGE UP (ref 0–4)
SODIUM SERPL-SCNC: 137 MMOL/L — SIGNIFICANT CHANGE UP (ref 135–145)
SP GR SPEC: 1.02 — SIGNIFICANT CHANGE UP (ref 1–1.03)
SQUAMOUS # UR AUTO: 2 /HPF — SIGNIFICANT CHANGE UP (ref 0–5)
UROBILINOGEN FLD QL: 0.2 MG/DL — SIGNIFICANT CHANGE UP (ref 0.2–1)
WBC # BLD: 11.67 K/UL — HIGH (ref 3.8–10.5)
WBC # FLD AUTO: 11.67 K/UL — HIGH (ref 3.8–10.5)
WBC UR QL: 3 /HPF — SIGNIFICANT CHANGE UP (ref 0–5)

## 2023-10-03 PROCEDURE — 99285 EMERGENCY DEPT VISIT HI MDM: CPT

## 2023-10-03 PROCEDURE — 74177 CT ABD & PELVIS W/CONTRAST: CPT | Mod: 26,MA

## 2023-10-03 PROCEDURE — 99223 1ST HOSP IP/OBS HIGH 75: CPT

## 2023-10-03 RX ORDER — ONDANSETRON 8 MG/1
4 TABLET, FILM COATED ORAL ONCE
Refills: 0 | Status: COMPLETED | OUTPATIENT
Start: 2023-10-03 | End: 2023-10-03

## 2023-10-03 RX ORDER — HYDROMORPHONE HYDROCHLORIDE 2 MG/ML
0.5 INJECTION INTRAMUSCULAR; INTRAVENOUS; SUBCUTANEOUS EVERY 4 HOURS
Refills: 0 | Status: DISCONTINUED | OUTPATIENT
Start: 2023-10-03 | End: 2023-10-04

## 2023-10-03 RX ORDER — HYDROMORPHONE HYDROCHLORIDE 2 MG/ML
1 INJECTION INTRAMUSCULAR; INTRAVENOUS; SUBCUTANEOUS ONCE
Refills: 0 | Status: DISCONTINUED | OUTPATIENT
Start: 2023-10-03 | End: 2023-10-03

## 2023-10-03 RX ORDER — MORPHINE SULFATE 50 MG/1
4 CAPSULE, EXTENDED RELEASE ORAL ONCE
Refills: 0 | Status: DISCONTINUED | OUTPATIENT
Start: 2023-10-03 | End: 2023-10-03

## 2023-10-03 RX ORDER — FAMOTIDINE 10 MG/ML
20 INJECTION INTRAVENOUS DAILY
Refills: 0 | Status: DISCONTINUED | OUTPATIENT
Start: 2023-10-03 | End: 2023-10-04

## 2023-10-03 RX ORDER — ENOXAPARIN SODIUM 100 MG/ML
40 INJECTION SUBCUTANEOUS EVERY 12 HOURS
Refills: 0 | Status: DISCONTINUED | OUTPATIENT
Start: 2023-10-03 | End: 2023-10-04

## 2023-10-03 RX ORDER — LOSARTAN POTASSIUM 100 MG/1
50 TABLET, FILM COATED ORAL DAILY
Refills: 0 | Status: DISCONTINUED | OUTPATIENT
Start: 2023-10-03 | End: 2023-10-04

## 2023-10-03 RX ORDER — SODIUM CHLORIDE 9 MG/ML
1000 INJECTION, SOLUTION INTRAVENOUS
Refills: 0 | Status: DISCONTINUED | OUTPATIENT
Start: 2023-10-03 | End: 2023-10-04

## 2023-10-03 RX ORDER — DIPHENHYDRAMINE HCL 50 MG
25 CAPSULE ORAL ONCE
Refills: 0 | Status: COMPLETED | OUTPATIENT
Start: 2023-10-03 | End: 2023-10-03

## 2023-10-03 RX ORDER — SEMAGLUTIDE 0.68 MG/ML
1 INJECTION, SOLUTION SUBCUTANEOUS
Refills: 0 | DISCHARGE

## 2023-10-03 RX ORDER — HYDROMORPHONE HYDROCHLORIDE 2 MG/ML
1 INJECTION INTRAMUSCULAR; INTRAVENOUS; SUBCUTANEOUS EVERY 4 HOURS
Refills: 0 | Status: DISCONTINUED | OUTPATIENT
Start: 2023-10-03 | End: 2023-10-04

## 2023-10-03 RX ORDER — METOCLOPRAMIDE HCL 10 MG
10 TABLET ORAL ONCE
Refills: 0 | Status: COMPLETED | OUTPATIENT
Start: 2023-10-03 | End: 2023-10-03

## 2023-10-03 RX ORDER — FUROSEMIDE 40 MG
1 TABLET ORAL
Refills: 0 | DISCHARGE

## 2023-10-03 RX ORDER — LOSARTAN POTASSIUM 100 MG/1
1 TABLET, FILM COATED ORAL
Refills: 0 | DISCHARGE

## 2023-10-03 RX ORDER — ONDANSETRON 8 MG/1
4 TABLET, FILM COATED ORAL EVERY 8 HOURS
Refills: 0 | Status: DISCONTINUED | OUTPATIENT
Start: 2023-10-03 | End: 2023-10-04

## 2023-10-03 RX ORDER — MAGNESIUM SULFATE 500 MG/ML
1 VIAL (ML) INJECTION ONCE
Refills: 0 | Status: COMPLETED | OUTPATIENT
Start: 2023-10-03 | End: 2023-10-03

## 2023-10-03 RX ORDER — SODIUM CHLORIDE 9 MG/ML
1000 INJECTION INTRAMUSCULAR; INTRAVENOUS; SUBCUTANEOUS ONCE
Refills: 0 | Status: COMPLETED | OUTPATIENT
Start: 2023-10-03 | End: 2023-10-03

## 2023-10-03 RX ORDER — PANTOPRAZOLE SODIUM 20 MG/1
40 TABLET, DELAYED RELEASE ORAL ONCE
Refills: 0 | Status: COMPLETED | OUTPATIENT
Start: 2023-10-03 | End: 2023-10-03

## 2023-10-03 RX ORDER — ACETAMINOPHEN 500 MG
1000 TABLET ORAL ONCE
Refills: 0 | Status: COMPLETED | OUTPATIENT
Start: 2023-10-03 | End: 2023-10-03

## 2023-10-03 RX ADMIN — ENOXAPARIN SODIUM 40 MILLIGRAM(S): 100 INJECTION SUBCUTANEOUS at 19:49

## 2023-10-03 RX ADMIN — HYDROMORPHONE HYDROCHLORIDE 1 MILLIGRAM(S): 2 INJECTION INTRAMUSCULAR; INTRAVENOUS; SUBCUTANEOUS at 06:21

## 2023-10-03 RX ADMIN — MORPHINE SULFATE 4 MILLIGRAM(S): 50 CAPSULE, EXTENDED RELEASE ORAL at 05:22

## 2023-10-03 RX ADMIN — SODIUM CHLORIDE 75 MILLILITER(S): 9 INJECTION, SOLUTION INTRAVENOUS at 16:49

## 2023-10-03 RX ADMIN — Medication 1000 MILLIGRAM(S): at 05:51

## 2023-10-03 RX ADMIN — Medication 30 MILLILITER(S): at 04:34

## 2023-10-03 RX ADMIN — PANTOPRAZOLE SODIUM 40 MILLIGRAM(S): 20 TABLET, DELAYED RELEASE ORAL at 04:33

## 2023-10-03 RX ADMIN — ONDANSETRON 4 MILLIGRAM(S): 8 TABLET, FILM COATED ORAL at 05:21

## 2023-10-03 RX ADMIN — HYDROMORPHONE HYDROCHLORIDE 1 MILLIGRAM(S): 2 INJECTION INTRAMUSCULAR; INTRAVENOUS; SUBCUTANEOUS at 07:11

## 2023-10-03 RX ADMIN — Medication 400 MILLIGRAM(S): at 04:35

## 2023-10-03 RX ADMIN — Medication 25 MILLIGRAM(S): at 19:48

## 2023-10-03 RX ADMIN — MORPHINE SULFATE 4 MILLIGRAM(S): 50 CAPSULE, EXTENDED RELEASE ORAL at 05:52

## 2023-10-03 RX ADMIN — SODIUM CHLORIDE 1000 MILLILITER(S): 9 INJECTION INTRAMUSCULAR; INTRAVENOUS; SUBCUTANEOUS at 04:33

## 2023-10-03 RX ADMIN — Medication 100 GRAM(S): at 19:56

## 2023-10-03 RX ADMIN — Medication 10 MILLIGRAM(S): at 06:30

## 2023-10-03 RX ADMIN — Medication 10 MILLIGRAM(S): at 19:46

## 2023-10-03 NOTE — ED PROVIDER NOTE - ATTENDING APP SHARED VISIT CONTRIBUTION OF CARE
52-year-old female past medical history of lymphedema and hypertension presenting with abdominal pain.  Started 3:30 PM epigastric in nature and is severe.  Associated with nausea with no emesis.  There has been nonbloody diarrhea multiple episodes.    Vitals: I have reviewed the patients vital signs  General: nontoxic appearing  HEENT: Atraumatic, normocephalic, airway patent  Eyes: EOMI, tracking appropriately  Neck: no tracheal deviation  Chest/Lungs: no trauma, symmetric chest rise, speaking in complete sentences,  no resp distress  Heart: skin and extremities well perfused, regular rate and rhythm  Neuro: A+Ox3, appears non focal  MSK: no deformities  Skin: no cyanosis, no jaundice   Psych:  Normal mood and affect  Abdomen: Soft tender to palpation in the epigastric region no rebound no guarding    52-year-old female past medical history of lymphedema and hypertension of the epigastric abdominal pain.  There is diarrhea without nausea.  The differential includes but is not limited to pancreatitis, gallbladder disease, colitis, peptic ulcer disease.  Based on the degree of pain patient will require labs imaging and pain control.  We will disposition based on the results of all of these factors

## 2023-10-03 NOTE — H&P ADULT - PROBLEM SELECTOR PLAN 1
presents with epigastric pain, n/v/d x 1 day duration  - lipase negative, CTAP negative, c.diff and GI PCR pending  - still with nausea - clear liquid diet, advance diet as tolerated  - maalox PRN, famotidine 20mg IV push daily, zofran 8mg TID PRN (QTC 425ms)  - pain control: minimal relief with morphine, Dilaudid 1mg q4h PRN severe pain/0.5mg q4h PRN moderate pain presents with epigastric pain, n/v/d x 1 day duration  - lipase negative, LFTs wnl, CTAP negative, GI PCR neg, c.diff pending  - still with nausea - clear liquid diet, advance diet as tolerated  - maalox PRN, famotidine 20mg IV push daily, zofran 8mg TID PRN (QTC 425ms)  - pain control: minimal relief with morphine, Dilaudid 1mg q4h PRN severe pain/0.5mg q4h PRN moderate pain

## 2023-10-03 NOTE — H&P ADULT - PROBLEM SELECTOR PLAN 2
BP stable  - reduce home losartan 100mg to 50mg for now with hold parameters  - monitor vital signs WBC 11  - likely reactive  - afebrile/CTAP negative/GI PCR neg - will observe off abx for now  - monitor fever curve

## 2023-10-03 NOTE — H&P ADULT - HISTORY OF PRESENT ILLNESS
52F with hx of HTN, lymphedema who presents with epigastric pain, n/v/d x 1 day duration. Patient was in usual state of health until yesterday morning when she started to experience some epigastric pain. She attributed this to acid reflux which she has from time to time. However when driving home from work she developed severe epigastric pain, associated with nausea, vomiting, and watery diarrhea. She had a light lunch at work (pretzels, 1 bite of turkey melt), but did not each much due to lack of appetite. For dinner she had a few bites of chicken and spinach. At night pain became unbearable, had multiple bouts of watery diarrhea, prompting visit to ED. Patient has hx of acid reflux, follows with GI has had multiple endoscopies in the past (last 3 years ago, no ulcers). She denies recent travel/sick contacts/abx use. 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 She denies fevers, chills, cp, sob, dysuria.

## 2023-10-03 NOTE — ED PROVIDER NOTE - CONSTITUTIONAL, MLM
normal... Well appearing, awake, alert, oriented to person, place, time/situation and in no apparent distress. Uncomfortable appearing, awake, alert, oriented to person, place, time/situation and in no immediate distress.

## 2023-10-03 NOTE — ED ADULT NURSE NOTE - OBJECTIVE STATEMENT
Pt received to room 25. Pt A&Ox4, ambulatory at baseline. Pt c/o epigastric pain, nausea, and diarrhea since 4pm yesterday. Pt endorses multiple episodes of diarrhea, approx 10-15, pt states that that stool is green in color, denies any bloody stools. Pt received to room 25. Pt A&Ox4, ambulatory at baseline. Pt c/o epigastric pain, nausea, and diarrhea since 4pm yesterday. Pt endorses multiple episodes of diarrhea, approx 10-15, pt states that that stool is green in color, denies any bloody stools. RR even and unlabored, NAD noted. Abd soft and nondistended. IV access established with 20G to R AC, labs collected and sent as per MD orders. Safety in place.

## 2023-10-03 NOTE — H&P ADULT - PROBLEM SELECTOR PLAN 4
DVT ppx: lovenox  DIET: Clears - advance as tolerated  DISPO: Home chronic  - hold home lasix for now, likely resume on discharge

## 2023-10-03 NOTE — ED ADULT TRIAGE NOTE - PAIN: PRESENCE, MLM
Pt called requesting Sertraline 100 mg. Pt has 6 left and will not have enough to get her to her appt on 4/14/23.      Med Requested:   Disp Refills Start End     sertraline (ZOLOFT) 100 MG tablet 30 tablet 6 8/4/2022     Sig - Route: Take 1 tablet by mouth daily.       Last visit: 8/4/22  Follow Up: 6 months  No Show/Cancel: none  Next visit: 4/14/2023     PROTOCOL APPROVED. Order prepped per providers note.     complains of pain/discomfort

## 2023-10-03 NOTE — ED PROVIDER NOTE - OBJECTIVE STATEMENT
51 Y/O F PMH HTN Lymphedema states she has been having epigastric abdominal pain with nausea and diarrhea since 330PM on 10/2/23. Pt denies ETOH or drug use, endorses remote H/o peptic ulcer disease not currently on medication. Pt denies recent travel, hospitalization or antibiotic use. Pt states she took Pepcid prior to ED arrival without improvement. Pt states that her pain is currently 7/10. Pt denies any other sx or acute complaints.

## 2023-10-03 NOTE — ED ADULT NURSE NOTE - NSFALLUNIVINTERV_ED_ALL_ED
Bed/Stretcher in lowest position, wheels locked, appropriate side rails in place/Call bell, personal items and telephone in reach/Instruct patient to call for assistance before getting out of bed/chair/stretcher/Non-slip footwear applied when patient is off stretcher/[place] to call system/Physically safe environment - no spills, clutter or unnecessary equipment/Purposeful proactive rounding/Room/bathroom lighting operational, light cord in reach

## 2023-10-03 NOTE — H&P ADULT - NSHPLABSRESULTS_GEN_ALL_CORE
14.9   11.67 )-----------( 270      ( 03 Oct 2023 04:34 )             43.6     10-03    137  |  101  |  17  ----------------------------<  113<H>  4.1   |  20<L>  |  0.80    Ca    9.8      03 Oct 2023 04:34    TPro  7.9  /  Alb  4.7  /  TBili  0.5  /  DBili  x   /  AST  17  /  ALT  17  /  AlkPhos  55  10-03    EKG: SR, QTC 425ms

## 2023-10-03 NOTE — H&P ADULT - NSHPPHYSICALEXAM_GEN_ALL_CORE
Vital Signs Last 24 Hrs  T(C): 36.3 (03 Oct 2023 08:33), Max: 36.7 (03 Oct 2023 05:20)  T(F): 97.3 (03 Oct 2023 08:33), Max: 98 (03 Oct 2023 05:20)  HR: 71 (03 Oct 2023 08:33) (68 - 84)  BP: 139/72 (03 Oct 2023 08:33) (130/74 - 145/90)  BP(mean): --  RR: 16 (03 Oct 2023 08:33) (16 - 18)  SpO2: 99% (03 Oct 2023 08:33) (99% - 100%)    Parameters below as of 03 Oct 2023 08:33  Patient On (Oxygen Delivery Method): room air        CONSTITUTIONAL: well-developed, well-groomed, no apparent distress  EYES: no conjunctival or scleral injection, non-icteric, PERRLA  ENMT: no external nasal lesions, oral mucosa with moist membranes  NECK: trachea midline, no palpable neck mass   RESPIRATORY: breathing comfortably, lungs CTA without wheeze/rales/rhonchi  CARDIOVASCULAR: regular rate and rhythm; +S1S2, no murmurs, rubs, or gallops, no lower extremity edema, 2+ peripheral pulses  GASTROINTESTINAL: soft, nontender, nondistended; +BS throughout, no rebound/guarding  MUSCULOSKELETAL: no joint effusions, normal strength and tone of extremities   NEUROLOGIC: non-focal, sensation intact to light touch in b/l upper and lower extremities   PSYCHIATRIC: AAOx3, appropriate mood and affect  SKIN: no rashes or lesions, warm

## 2023-10-03 NOTE — ED PROVIDER NOTE - BIRTH SEX
provera  Last visit: 10/1/21  Follow up visit: none  Last refill: 7/12/21      Refilled per protocol      Female

## 2023-10-03 NOTE — PATIENT PROFILE ADULT - NSPROSPHOSPCHAPLAINYN_GEN_A_NUR
Chief complaint:   Chief Complaint   Patient presents with   • Thyroid Problem     Follow up   • Medication Management     Follow up and needs refills   • Eye Problem     Right eye, swollen in corner.   • Tightness     Having chest tightness, allergies possible   • Leg     Both legs, she has tingling.       Vitals:  Visit Vitals  /64 (BP Location: LUE - Left upper extremity, Patient Position: Sitting, Cuff Size: Large Adult)   Pulse 88   Ht 5' 8\" (1.727 m)   Wt 59.7 kg (131 lb 10.9 oz)   LMP 04/04/2018   SpO2 99%   BMI 20.02 kg/m²       HISTORY OF PRESENT ILLNESS     HPI here in follow-up she is doing okay looking for to 1 should be able to drive her car all of this started back in 2019 alcohol.  Divorce is moving along but there is problems at the  does not Wanna give some things things that she really like,keeps them at the house from her.  Then she had been at a wedding and it some drinking which she is not to do it was February 2nd a believe it she said and was cold she sat in the car.  No intent to drive at all started the car and someone called police thinking they are helpful because she is not drive which she was not doing now there was another charge         She has been staying with a sister and sometimes can be little bit tight P is been long she stressor  replaced because she can not.  But they are doing okay.  She has been talking to a counselor.  Other significant problems:  Patient Active Problem List    Diagnosis Date Noted   • Situational mixed anxiety and depressive disorder 10/27/2016     Priority: Low   • Left hip pain 07/27/2015     Priority: Low   • Overdose of drug/medicinal substance 05/27/2015     Priority: Low   • Chronic depression 01/21/2015     Priority: Low   • Rosacea 05/30/2014     Priority: Low   • Environmental allergies 05/30/2014     Priority: Low   • Muscle strain 05/30/2014     Priority: Low   • Rhinitis, allergic 03/10/2014     Priority: Low   • Reactive  airway disease 03/10/2014     Priority: Low       PAST MEDICAL, FAMILY AND SOCIAL HISTORY     Medications:  Current Outpatient Medications   Medication Sig Dispense Refill   • citalopram (CeleXA) 40 MG tablet Take 1 tablet by mouth once daily 30 tablet 5   • traZODone (DESYREL) 100 MG tablet Take 1 tablet by mouth nightly. 30 tablet 3   • Cholecalciferol (Vitamin D3) 1.25 mg (50,000 units) tablet Take 1 tablet by mouth 1 day a week. 12 tablet 0   • gabapentin (NEURONTIN) 100 MG capsule Take 2 capsules by mouth 3 times daily. 540 capsule 0   • albuterol (ProAir HFA) 108 (90 Base) MCG/ACT inhaler Inhale 2 puffs into the lungs every 4 hours as needed for Shortness of Breath or Wheezing. 1 each 6   • levothyroxine 50 MCG tablet Take 1 tablet by mouth once daily 90 tablet 0   • fluticasone (FLONASE) 50 MCG/ACT nasal spray Spray 1-2 sprays in each nostril daily. 1 Inhaler 12   • docusate sodium (COLACE) 100 MG capsule Take 100 mg by mouth daily.     • traMADol (ULTRAM) 50 MG tablet Take 1 tablet by mouth every 6 hours as needed for Pain. 30 tablet 1     No current facility-administered medications for this visit.       Allergies:  ALLERGIES:  No Known Allergies    Past Medical  History/Surgeries:  Past Medical History:   Diagnosis Date   • Allergy     nasal and chest.   • Bipolar affective disorder (CMD)    • COVID-19 01/2021    see phone calls   • Depressive disorder    • Overdose of drug/medicinal substance 5/27/2015       Past Surgical History:   Procedure Laterality Date   • Elbow arthroplasty  01/01/2009    right elbow for epicondylitis   • Nasal scopy,open maxill sinus      8/1/2021   • Tubal ligation  12/01/1987       Family History:  Family History   Problem Relation Age of Onset   • Other Mother         memory loss   • Heart Father    • Myocardial Infarction Brother        Social History:  Social History     Tobacco Use   • Smoking status: Former     Current packs/day: 0.00     Average packs/day: 1.5 packs/day  for 8.0 years (12.0 pk-yrs)     Types: Cigarettes     Start date: 3/13/1978     Quit date: 3/13/1986     Years since quittin.1   • Smokeless tobacco: Never   • Tobacco comments:     cont no smoke   Vaping Use   • Vaping status: Not on file   Substance Use Topics   • Alcohol use: No     Comment: now off       REVIEW OF SYSTEMS     Review of Systems no chills or fever denies dysphagia hoarseness daily cough sputum production angina exertional dyspnea nausea vomiting diarrhea dysuria hematuria the all negative.  Joints are okay.  She will get just pressure on her chest totally unrelated to activity Um just stress but she is dealing with that.    PHYSICAL EXAM     Physical Exam cheerful at times it here to when she talks about her situation of basically she is doing okay has not been able to work for 4 months waiting for a carpal tunnel to heal and then to approach the left arm.  This is all under workman's comp  Tympanic membranes are normal CHLEE EOM intact conjunctiva negative pharynx clear membranes moist of good color neck is supple no adenopathy or bruit chest clear to auscultation heart regular no murmur S3 or S4 abdomen soft no mass or tenderness extremities no edema peripheral pulses good.    ASSESSMENT/PLAN     Encouraged her situation how were trusting that the lower will be able to bring this to a conclusion with [facility] that she can get back to work and then that she can proceed to get the left cared for in terms of carpal tunnel.  Told her if there is a problem needing preop for his arm just  in.  Emmy was seen today for thyroid problem, medication management, eye problem, tightness and leg.    Diagnoses and all orders for this visit:    Chronic depression    Left arm pain    B12 deficiency    Carpal tunnel syndrome of right wrist       no

## 2023-10-03 NOTE — ED ADULT TRIAGE NOTE - CHIEF COMPLAINT QUOTE
Patient c/o epigastric pain, nausea and diarrhea since 4PM. Denies fevers, chills, chest pain and SOB. Hx. HTN and lymphedema.

## 2023-10-03 NOTE — ED ADULT NURSE REASSESSMENT NOTE - NSFALLUNIVINTERV_ED_ALL_ED
Bed/Stretcher in lowest position, wheels locked, appropriate side rails in place/Call bell, personal items and telephone in reach/Instruct patient to call for assistance before getting out of bed/chair/stretcher/Non-slip footwear applied when patient is off stretcher/[place] to call system/Physically safe environment - no spills, clutter or unnecessary equipment/Purposeful proactive rounding/Room/bathroom lighting operational, light cord in reach

## 2023-10-03 NOTE — ED PROVIDER NOTE - CLINICAL SUMMARY MEDICAL DECISION MAKING FREE TEXT BOX
53 Y/O F PMH HTN Lymphedema states she has been having epigastric abdominal pain with nausea and diarrhea since 330PM on 10/2/23. Pt denies ETOH or drug use, endorses remote H/o peptic ulcer disease not currently on medication. Plan is CT to eval for colitis, pancreatic abnormality or other acute abdominal pathology. Will obtain a UA and culture to eval for a UTI, will obtain stool studies if pt is able to provide them to eval for infectious colitis and will obtain labs to eval for anemia or electrolyte disturbance. Pt has no c diff risk factors. Will hydrate with IVF, give Iv tylenol and morphine for pain and will give Zofran for nausea.

## 2023-10-03 NOTE — H&P ADULT - PROBLEM SELECTOR PLAN 3
chronic  - hold home lasix for now, likely resume on discharge BP stable  - reduce home losartan 100mg to 50mg for now with hold parameters  - monitor vital signs

## 2023-10-03 NOTE — ED ADULT NURSE REASSESSMENT NOTE - NS ED NURSE REASSESS COMMENT FT1
Pt at baseline mental status, resting in stretcher. RR even and unlabored. Pt reporting some relief in abd discomfort. Pt well appearing in NAD. Safety in place, pt stable upon exiting room
Pt received in on acute distress, A&Ox4, resting calmly. Pt reports abd pain improved after having medication. Denies nausea, vomiting, chest pain, and sob. Pt pending admitting MD, and bed assignment.

## 2023-10-03 NOTE — PATIENT PROFILE ADULT - FALL HARM RISK - HARM RISK INTERVENTIONS
Assistance with ambulation/Assistance OOB with selected safe patient handling equipment/Communicate Risk of Fall with Harm to all staff/Monitor for mental status changes/Monitor gait and stability/Reinforce activity limits and safety measures with patient and family/Reorient to person, place and time as needed/Review medications for side effects contributing to fall risk/Sit up slowly, dangle for a short time, stand at bedside before walking/Tailored Fall Risk Interventions/Toileting schedule using arm’s reach rule for commode and bathroom/Use of alarms - bed, chair and/or voice tab/Visual Cue: Yellow wristband and red socks/Bed in lowest position, wheels locked, appropriate side rails in place/Call bell, personal items and telephone in reach/Instruct patient to call for assistance before getting out of bed or chair/Non-slip footwear when patient is out of bed/[place] to call system/Physically safe environment - no spills, clutter or unnecessary equipment/Purposeful Proactive Rounding/Room/bathroom lighting operational, light cord in reach

## 2023-10-03 NOTE — ED PROVIDER NOTE - CARE PLAN
1 Principal Discharge DX:	Epigastric abdominal pain  Secondary Diagnosis:	Nausea and vomiting  Secondary Diagnosis:	Diarrhea

## 2023-10-04 ENCOUNTER — TRANSCRIPTION ENCOUNTER (OUTPATIENT)
Age: 53
End: 2023-10-04

## 2023-10-04 VITALS
DIASTOLIC BLOOD PRESSURE: 64 MMHG | TEMPERATURE: 98 F | HEART RATE: 71 BPM | SYSTOLIC BLOOD PRESSURE: 127 MMHG | OXYGEN SATURATION: 100 %

## 2023-10-04 LAB
ANION GAP SERPL CALC-SCNC: 8 MMOL/L — SIGNIFICANT CHANGE UP (ref 7–14)
BASOPHILS # BLD AUTO: 0.02 K/UL — SIGNIFICANT CHANGE UP (ref 0–0.2)
BASOPHILS NFR BLD AUTO: 0.3 % — SIGNIFICANT CHANGE UP (ref 0–2)
BUN SERPL-MCNC: 13 MG/DL — SIGNIFICANT CHANGE UP (ref 7–23)
CALCIUM SERPL-MCNC: 8.7 MG/DL — SIGNIFICANT CHANGE UP (ref 8.4–10.5)
CHLORIDE SERPL-SCNC: 109 MMOL/L — HIGH (ref 98–107)
CO2 SERPL-SCNC: 23 MMOL/L — SIGNIFICANT CHANGE UP (ref 22–31)
CREAT SERPL-MCNC: 0.69 MG/DL — SIGNIFICANT CHANGE UP (ref 0.5–1.3)
CULTURE RESULTS: SIGNIFICANT CHANGE UP
EGFR: 104 ML/MIN/1.73M2 — SIGNIFICANT CHANGE UP
EOSINOPHIL # BLD AUTO: 0.29 K/UL — SIGNIFICANT CHANGE UP (ref 0–0.5)
EOSINOPHIL NFR BLD AUTO: 4.7 % — SIGNIFICANT CHANGE UP (ref 0–6)
GLUCOSE SERPL-MCNC: 88 MG/DL — SIGNIFICANT CHANGE UP (ref 70–99)
HCT VFR BLD CALC: 35.3 % — SIGNIFICANT CHANGE UP (ref 34.5–45)
HGB BLD-MCNC: 12.4 G/DL — SIGNIFICANT CHANGE UP (ref 11.5–15.5)
IANC: 3.6 K/UL — SIGNIFICANT CHANGE UP (ref 1.8–7.4)
IMM GRANULOCYTES NFR BLD AUTO: 0.3 % — SIGNIFICANT CHANGE UP (ref 0–0.9)
LYMPHOCYTES # BLD AUTO: 1.79 K/UL — SIGNIFICANT CHANGE UP (ref 1–3.3)
LYMPHOCYTES # BLD AUTO: 29.1 % — SIGNIFICANT CHANGE UP (ref 13–44)
MAGNESIUM SERPL-MCNC: 2.2 MG/DL — SIGNIFICANT CHANGE UP (ref 1.6–2.6)
MCHC RBC-ENTMCNC: 30.2 PG — SIGNIFICANT CHANGE UP (ref 27–34)
MCHC RBC-ENTMCNC: 35.1 GM/DL — SIGNIFICANT CHANGE UP (ref 32–36)
MCV RBC AUTO: 86.1 FL — SIGNIFICANT CHANGE UP (ref 80–100)
MONOCYTES # BLD AUTO: 0.43 K/UL — SIGNIFICANT CHANGE UP (ref 0–0.9)
MONOCYTES NFR BLD AUTO: 7 % — SIGNIFICANT CHANGE UP (ref 2–14)
NEUTROPHILS # BLD AUTO: 3.6 K/UL — SIGNIFICANT CHANGE UP (ref 1.8–7.4)
NEUTROPHILS NFR BLD AUTO: 58.6 % — SIGNIFICANT CHANGE UP (ref 43–77)
NRBC # BLD: 0 /100 WBCS — SIGNIFICANT CHANGE UP (ref 0–0)
NRBC # FLD: 0 K/UL — SIGNIFICANT CHANGE UP (ref 0–0)
PHOSPHATE SERPL-MCNC: 3.3 MG/DL — SIGNIFICANT CHANGE UP (ref 2.5–4.5)
PLATELET # BLD AUTO: 202 K/UL — SIGNIFICANT CHANGE UP (ref 150–400)
POTASSIUM SERPL-MCNC: 4 MMOL/L — SIGNIFICANT CHANGE UP (ref 3.5–5.3)
POTASSIUM SERPL-SCNC: 4 MMOL/L — SIGNIFICANT CHANGE UP (ref 3.5–5.3)
RBC # BLD: 4.1 M/UL — SIGNIFICANT CHANGE UP (ref 3.8–5.2)
RBC # FLD: 13.3 % — SIGNIFICANT CHANGE UP (ref 10.3–14.5)
SODIUM SERPL-SCNC: 140 MMOL/L — SIGNIFICANT CHANGE UP (ref 135–145)
SPECIMEN SOURCE: SIGNIFICANT CHANGE UP
WBC # BLD: 6.15 K/UL — SIGNIFICANT CHANGE UP (ref 3.8–10.5)
WBC # FLD AUTO: 6.15 K/UL — SIGNIFICANT CHANGE UP (ref 3.8–10.5)

## 2023-10-04 PROCEDURE — 99239 HOSP IP/OBS DSCHRG MGMT >30: CPT

## 2023-10-04 RX ORDER — INFLUENZA VIRUS VACCINE 15; 15; 15; 15 UG/.5ML; UG/.5ML; UG/.5ML; UG/.5ML
0.5 SUSPENSION INTRAMUSCULAR ONCE
Refills: 0 | Status: DISCONTINUED | OUTPATIENT
Start: 2023-10-04 | End: 2023-10-04

## 2023-10-04 RX ORDER — SUMATRIPTAN SUCCINATE 4 MG/.5ML
50 INJECTION, SOLUTION SUBCUTANEOUS ONCE
Refills: 0 | Status: COMPLETED | OUTPATIENT
Start: 2023-10-04 | End: 2023-10-04

## 2023-10-04 RX ORDER — IBUPROFEN 200 MG
400 TABLET ORAL ONCE
Refills: 0 | Status: COMPLETED | OUTPATIENT
Start: 2023-10-04 | End: 2023-10-04

## 2023-10-04 RX ORDER — SUMATRIPTAN SUCCINATE 4 MG/.5ML
1 INJECTION, SOLUTION SUBCUTANEOUS
Refills: 0 | DISCHARGE

## 2023-10-04 RX ORDER — CHLORHEXIDINE GLUCONATE 213 G/1000ML
1 SOLUTION TOPICAL DAILY
Refills: 0 | Status: DISCONTINUED | OUTPATIENT
Start: 2023-10-04 | End: 2023-10-04

## 2023-10-04 RX ADMIN — Medication 400 MILLIGRAM(S): at 06:28

## 2023-10-04 RX ADMIN — SUMATRIPTAN SUCCINATE 50 MILLIGRAM(S): 4 INJECTION, SOLUTION SUBCUTANEOUS at 08:17

## 2023-10-04 RX ADMIN — SODIUM CHLORIDE 75 MILLILITER(S): 9 INJECTION, SOLUTION INTRAVENOUS at 07:46

## 2023-10-04 RX ADMIN — LOSARTAN POTASSIUM 50 MILLIGRAM(S): 100 TABLET, FILM COATED ORAL at 06:42

## 2023-10-04 RX ADMIN — ENOXAPARIN SODIUM 40 MILLIGRAM(S): 100 INJECTION SUBCUTANEOUS at 07:46

## 2023-10-04 RX ADMIN — Medication 400 MILLIGRAM(S): at 07:00

## 2023-10-04 RX ADMIN — CHLORHEXIDINE GLUCONATE 1 APPLICATION(S): 213 SOLUTION TOPICAL at 13:18

## 2023-10-04 RX ADMIN — ONDANSETRON 4 MILLIGRAM(S): 8 TABLET, FILM COATED ORAL at 07:46

## 2023-10-04 RX ADMIN — SUMATRIPTAN SUCCINATE 50 MILLIGRAM(S): 4 INJECTION, SOLUTION SUBCUTANEOUS at 08:21

## 2023-10-04 NOTE — DISCHARGE NOTE PROVIDER - NSDCFUSCHEDAPPT_GEN_ALL_CORE_FT
Janet Turcios  [facility]  [address]  Scheduled Appointment: 10/30/2023

## 2023-10-04 NOTE — DISCHARGE NOTE PROVIDER - NSDCCPCAREPLAN_GEN_ALL_CORE_FT
PRINCIPAL DISCHARGE DIAGNOSIS  Diagnosis: Epigastric abdominal pain  Assessment and Plan of Treatment: Improved      SECONDARY DISCHARGE DIAGNOSES  Diagnosis: HTN (hypertension)  Assessment and Plan of Treatment: Continue blood pressure medication regimen as directed. Monitor for any visual changes, headaches or dizziness.  Monitor blood pressure regularly.  Follow up with your PCP for further management for high blood pressure.      Diagnosis: Nausea and vomiting  Assessment and Plan of Treatment: resolved  Tolerated advancement in diet    Diagnosis: Diarrhea  Assessment and Plan of Treatment: Resolved    Diagnosis: Leukocytosis  Assessment and Plan of Treatment: likely reactive  afebrile/CTAP negative/GI PCR neg -Observed of antibiotics     PRINCIPAL DISCHARGE DIAGNOSIS  Diagnosis: Epigastric abdominal pain  Assessment and Plan of Treatment: Improved, continue your regular outpatient follow up with your doctors.      SECONDARY DISCHARGE DIAGNOSES  Diagnosis: Nausea and vomiting  Assessment and Plan of Treatment: resolved  Tolerated advancement in diet    Diagnosis: Diarrhea  Assessment and Plan of Treatment: Resolved    Diagnosis: Leukocytosis  Assessment and Plan of Treatment: likely reactive  afebrile/CTAP negative/GI PCR neg -Observed of antibiotics    Diagnosis: HTN (hypertension)  Assessment and Plan of Treatment: Continue blood pressure medication regimen as directed. Monitor for any visual changes, headaches or dizziness.  Monitor blood pressure regularly.  Follow up with your PCP for further management for high blood pressure.

## 2023-10-04 NOTE — DISCHARGE NOTE PROVIDER - NSDCQMCOGNITION_NEU_ALL_CORE
Post-Care Instructions: I reviewed with the patient in detail post-care instructions. Patient is to wear sun protection. Patients may expect sunburn like redness, discomfort and scabbing. Protocol For Photochemotherapy: Tar And Nbuvb (Goeckerman Treatment): The patient received Photochemotherapy: Tar and NBUVB (Goeckerman treatment). Protocol For Uva: The patient received UVA. Protocol For Photochemotherapy For Severe Photoresponsive Dermatoses: Tar And Broad Band Uvb (Goeckerman Treatment): The patient received Photochemotherapy for severe photoresponsive dermatoses: Tar and Broad Band UVB (Goeckerman treatment) requiring at least 4 to 8 hours of care under direct physician supervision. Treatment Number: 1 Protocol For Photochemotherapy For Severe Photoresponsive Dermatoses: Puva: The patient received Photochemotherapy for severe photoresponsive dermatoses: PUVA requiring at least 4 to 8 hours of care under direct physician supervision. Protocol For Photochemotherapy: Petrolatum And Nbuvb: The patient received Photochemotherapy: Petrolatum and NBUVB (petrolatum applied to all lesions prior to phototherapy). Protocol For Photochemotherapy: Triamcinolone Ointment And Nbuvb: The patient received Photochemotherapy: Triamcinolone and NBUVB (triamcinolone ointment applied to all lesions prior to phototherapy). Consent: The risks were reviewed with the patient including but not limited to: burn, pigmentary changes, pain, blistering, scabbing, redness, increased risk of skin cancers, and the remote possibility of scarring. Protocol For Photochemotherapy: Mineral Oil And Nbuvb: The patient received Photochemotherapy: Mineral Oil and NBUVB (mineral oil applied to all lesions prior to phototherapy). Protocol For Puva: The patient received PUVA. Protocol For Photochemotherapy For Severe Photoresponsive Dermatoses: Tar And Nbuvb (Goeckerman Treatment): The patient received Photochemotherapy for severe photoresponsive dermatoses: Tar and NBUVB (Goeckerman treatment) requiring at least 4 to 8 hours of care under direct physician supervision. Protocol For Photochemotherapy: Petrolatum And Broad Band Uvb: The patient received Photochemotherapy: Petrolatum and Broad Band UVB. Total Body Time: 30 sec Protocol For Nb Uva: The patient received NB UVA. Protocol For Nbuvb: The patient received NBUVB. Detail Level: Zone Protocol For Broad Band Uvb: The patient received Broad Band UVB. Protocol For Bath Puva: The patient received Bath PUVA. No difficulties Protocol For Photochemotherapy For Severe Photoresponsive Dermatoses: Petrolatum And Nbuvb: The patient received Photochemotherapy for severe photoresponsive dermatoses: Petrolatum and NBUVB requiring at least 4 to 8 hours of care under direct physician supervision. Protocol For Photochemotherapy: Tar And Broad Band Uvb (Goeckerman Treatment): The patient received Photochemotherapy: Tar and Broad Band UVB (Goeckerman treatment). Protocol For Uva1: The patient received UVA1. Protocol For Photochemotherapy: Mineral Oil And Broad Band Uvb: The patient received Photochemotherapy: Mineral Oil and Broad Band UVB. Protocol For Photochemotherapy For Severe Photoresponsive Dermatoses: Petrolatum And Broad Band Uvb: The patient received Photochemotherapyfor severe photoresponsive dermatoses: Petrolatum and Broad Band UVB requiring at least 4 to 8 hours of care under direct physician supervision. Protocol For Photochemotherapy: Baby Oil And Nbuvb: The patient received Photochemotherapy: Baby Oil and NBUVB (baby oil applied to all lesions prior to phototherapy). Protocol: NBUVB Protocol For Protocol For Photochemotherapy For Severe Photoresponsive Dermatoses: Bath Puva: The patient received Photochemotherapy for severe photoresponsive dermatoses: Bath PUVA requiring at least 4 to 8 hours of care under direct physician supervision.

## 2023-10-04 NOTE — DISCHARGE NOTE PROVIDER - NSDCMRMEDTOKEN_GEN_ALL_CORE_FT
Lasix 40 mg oral tablet: 1 tab(s) orally once a day  losartan 100 mg oral tablet: 1 tab(s) orally once a day  Ozempic 2 mg/1.5 mL (1 mg dose) subcutaneous solution: 1 milligram(s) subcutaneously once a week  SUMAtriptan 50 mg oral tablet: 1 tab(s) orally once a day as needed for  headache

## 2023-10-04 NOTE — DISCHARGE NOTE NURSING/CASE MANAGEMENT/SOCIAL WORK - NSDCPEFALRISK_GEN_ALL_CORE
For information on Fall & Injury Prevention, visit: [email or web address] OR  [email or web address] OR  https://www.cdc.gov/steadi/patient.html

## 2023-10-04 NOTE — DISCHARGE NOTE NURSING/CASE MANAGEMENT/SOCIAL WORK - PATIENT PORTAL LINK FT
You can access the FollowMyHealth Patient Portal offered by [facility] by registering at the following website: [email or web address]. By joining [email or web address] FollowMyHealth portal, you will also be able to view your health information using other applications (apps) compatible with our system.

## 2023-10-04 NOTE — CHART NOTE - NSCHARTNOTEFT_GEN_A_CORE
Writer called by primary nurse. Per RN patient complaining of worsening headache. Patient received headache cocktail in the ED and last received ibuprofen with no improvement. Patient reports taking Imitrex at home for headaches. Patient with prescription bottle in her bag. Last dispensed 9/12/23. Will give home medication at this time. Monitor response.

## 2023-10-04 NOTE — DISCHARGE NOTE PROVIDER - ATTENDING DISCHARGE PHYSICAL EXAMINATION:
CONSTITUTIONAL: NAD, pleasant  RESPIRATORY: Normal respiratory effort; no respiratory distress, CTAB  CARDIOVASCULAR: No visible JVD, No lower extremity edema; S1S2, no m,r,g  ABDOMEN: Not guarding, does not appear distended, BS+  MUSCLOSKELETAL: no clubbing or cyanosis of digits; no joint swelling   PSYCH: AOx3

## 2023-10-04 NOTE — DISCHARGE NOTE PROVIDER - HOSPITAL COURSE
Patient is a 52F with hx of HTN, lymphedema who presents with epigastric pain, n/v/d x 1 day duration. Lipase negative, LFTs wnl, CTAP negative, GI PCR neg, c.diff negative  Patient tolerated advancement in diet.

## 2023-10-30 ENCOUNTER — APPOINTMENT (OUTPATIENT)
Dept: OPHTHALMOLOGY | Facility: CLINIC | Age: 53
End: 2023-10-30
Payer: COMMERCIAL

## 2023-10-30 ENCOUNTER — NON-APPOINTMENT (OUTPATIENT)
Age: 53
End: 2023-10-30

## 2023-10-30 PROCEDURE — 92014 COMPRE OPH EXAM EST PT 1/>: CPT

## 2023-10-30 PROCEDURE — 92133 CPTRZD OPH DX IMG PST SGM ON: CPT

## 2023-11-02 ENCOUNTER — NON-APPOINTMENT (OUTPATIENT)
Age: 53
End: 2023-11-02

## 2023-12-05 ENCOUNTER — TRANSCRIPTION ENCOUNTER (OUTPATIENT)
Age: 53
End: 2023-12-05

## 2024-03-21 ENCOUNTER — NON-APPOINTMENT (OUTPATIENT)
Age: 54
End: 2024-03-21

## 2024-03-27 ENCOUNTER — APPOINTMENT (OUTPATIENT)
Dept: INTERNAL MEDICINE | Facility: CLINIC | Age: 54
End: 2024-03-27
Payer: COMMERCIAL

## 2024-03-27 VITALS — DIASTOLIC BLOOD PRESSURE: 82 MMHG | SYSTOLIC BLOOD PRESSURE: 140 MMHG

## 2024-03-27 VITALS
RESPIRATION RATE: 14 BRPM | HEIGHT: 62 IN | HEART RATE: 66 BPM | BODY MASS INDEX: 45.08 KG/M2 | TEMPERATURE: 97.2 F | OXYGEN SATURATION: 99 % | WEIGHT: 245 LBS

## 2024-03-27 DIAGNOSIS — E66.01 MORBID (SEVERE) OBESITY DUE TO EXCESS CALORIES: ICD-10-CM

## 2024-03-27 DIAGNOSIS — Z00.00 ENCOUNTER FOR GENERAL ADULT MEDICAL EXAMINATION W/OUT ABNORMAL FINDINGS: ICD-10-CM

## 2024-03-27 PROCEDURE — G0444 DEPRESSION SCREEN ANNUAL: CPT | Mod: 59

## 2024-03-27 PROCEDURE — 99396 PREV VISIT EST AGE 40-64: CPT

## 2024-03-27 RX ORDER — TIRZEPATIDE 2.5 MG/.5ML
2.5 INJECTION, SOLUTION SUBCUTANEOUS
Qty: 1 | Refills: 0 | Status: ACTIVE | COMMUNITY
Start: 2024-03-27 | End: 1900-01-01

## 2024-03-27 RX ORDER — SEMAGLUTIDE 0.68 MG/ML
2 INJECTION, SOLUTION SUBCUTANEOUS
Qty: 3 | Refills: 1 | Status: DISCONTINUED | COMMUNITY
Start: 2021-07-19 | End: 2024-03-27

## 2024-03-27 RX ORDER — SUMATRIPTAN 50 MG/1
50 TABLET, FILM COATED ORAL
Qty: 3 | Refills: 5 | Status: ACTIVE | COMMUNITY
Start: 2019-09-04 | End: 1900-01-01

## 2024-03-27 RX ORDER — FUROSEMIDE 20 MG/1
20 TABLET ORAL
Qty: 90 | Refills: 1 | Status: ACTIVE | COMMUNITY
Start: 2020-07-06 | End: 1900-01-01

## 2024-03-27 RX ORDER — DICLOFENAC SODIUM 50 MG/1
50 TABLET, DELAYED RELEASE ORAL TWICE DAILY
Qty: 20 | Refills: 0 | Status: DISCONTINUED | COMMUNITY
Start: 2023-05-26 | End: 2024-03-27

## 2024-03-27 RX ORDER — TIRZEPATIDE 2.5 MG/.5ML
2.5 INJECTION, SOLUTION SUBCUTANEOUS
Qty: 1 | Refills: 0 | Status: DISCONTINUED | COMMUNITY
Start: 2023-09-21 | End: 2024-03-27

## 2024-04-01 ENCOUNTER — APPOINTMENT (OUTPATIENT)
Dept: OBGYN | Facility: CLINIC | Age: 54
End: 2024-04-01
Payer: COMMERCIAL

## 2024-04-01 VITALS
BODY MASS INDEX: 45.08 KG/M2 | SYSTOLIC BLOOD PRESSURE: 149 MMHG | HEIGHT: 62 IN | WEIGHT: 245 LBS | DIASTOLIC BLOOD PRESSURE: 89 MMHG | HEART RATE: 66 BPM

## 2024-04-01 DIAGNOSIS — R92.30 DENSE BREASTS, UNSPECIFIED: ICD-10-CM

## 2024-04-01 DIAGNOSIS — N81.89 OTHER FEMALE GENITAL PROLAPSE: ICD-10-CM

## 2024-04-01 PROCEDURE — 99212 OFFICE O/P EST SF 10 MIN: CPT

## 2024-04-21 LAB
25(OH)D3 SERPL-MCNC: 33 NG/ML
ALBUMIN SERPL ELPH-MCNC: 4.9 G/DL
ALP BLD-CCNC: 61 U/L
ALT SERPL-CCNC: 22 U/L
ANION GAP SERPL CALC-SCNC: 14 MMOL/L
APPEARANCE: CLEAR
AST SERPL-CCNC: 19 U/L
BILIRUB SERPL-MCNC: 0.6 MG/DL
BILIRUBIN URINE: NEGATIVE
BLOOD URINE: NEGATIVE
BUN SERPL-MCNC: 17 MG/DL
CALCIUM SERPL-MCNC: 10.1 MG/DL
CHLORIDE SERPL-SCNC: 102 MMOL/L
CHOLEST SERPL-MCNC: 202 MG/DL
CO2 SERPL-SCNC: 25 MMOL/L
COLOR: YELLOW
CREAT SERPL-MCNC: 0.7 MG/DL
EGFR: 103 ML/MIN/1.73M2
ESTIMATED AVERAGE GLUCOSE: 100 MG/DL
GLUCOSE QUALITATIVE U: NEGATIVE MG/DL
GLUCOSE SERPL-MCNC: 88 MG/DL
HBA1C MFR BLD HPLC: 5.1 %
HDLC SERPL-MCNC: 73 MG/DL
KETONES URINE: NEGATIVE MG/DL
LDLC SERPL CALC-MCNC: 104 MG/DL
LEUKOCYTE ESTERASE URINE: NEGATIVE
NITRITE URINE: NEGATIVE
NONHDLC SERPL-MCNC: 129 MG/DL
PH URINE: 6
POTASSIUM SERPL-SCNC: 4.4 MMOL/L
PROT SERPL-MCNC: 7.9 G/DL
PROTEIN URINE: NEGATIVE MG/DL
SODIUM SERPL-SCNC: 141 MMOL/L
SPECIFIC GRAVITY URINE: 1.01
TRIGL SERPL-MCNC: 149 MG/DL
TSH SERPL-ACNC: 1.66 UIU/ML
URINE CYTOLOGY: NORMAL
UROBILINOGEN URINE: 0.2 MG/DL
VIT B12 SERPL-MCNC: 502 PG/ML

## 2024-04-24 ENCOUNTER — APPOINTMENT (OUTPATIENT)
Dept: UROLOGY | Facility: CLINIC | Age: 54
End: 2024-04-24

## 2024-04-24 LAB
BASOPHILS # BLD AUTO: 0.04 K/UL
BASOPHILS NFR BLD AUTO: 0.5 %
EOSINOPHIL # BLD AUTO: 0.18 K/UL
EOSINOPHIL NFR BLD AUTO: 2.2 %
HCT VFR BLD CALC: 47 %
HGB BLD-MCNC: 15.8 G/DL
IMM GRANULOCYTES NFR BLD AUTO: 0.2 %
LYMPHOCYTES # BLD AUTO: 2.24 K/UL
LYMPHOCYTES NFR BLD AUTO: 27.7 %
MAN DIFF?: NORMAL
MCHC RBC-ENTMCNC: 29.3 PG
MCHC RBC-ENTMCNC: 33.6 GM/DL
MCV RBC AUTO: 87.2 FL
MONOCYTES # BLD AUTO: 0.45 K/UL
MONOCYTES NFR BLD AUTO: 5.6 %
NEUTROPHILS # BLD AUTO: 5.15 K/UL
NEUTROPHILS NFR BLD AUTO: 63.8 %
PLATELET # BLD AUTO: 251 K/UL
RBC # BLD: 5.39 M/UL
RBC # FLD: 13.4 %
WBC # FLD AUTO: 8.08 K/UL

## 2024-04-26 NOTE — ASSESSMENT
[FreeTextEntry1] : Physical  UTD with colonoscopy, bone density scan Referred for mammogram pt has linda't with GYN in June for PAP  Dropped bladder has linda't with urology next month- referral given  Obesity pt states she has stopped Ozempic 1 mg weekly d/t burning pain in abd and diarrhea, N/V  pt to start Zepbound, advised to eat smaller meals Weight management, Healthy food choices, and increased physical activity were discussed  HTN  cont Losartan 100 mg daily -renewed today cont Lasix 20 mg daily -renewed today reinforced importance of following a low salt diet, weight loss, exercise  HLD cont Lipitor 10mg daily  Reinforced the importance of following a low fat/low cholesterol diet  History of Migraine headaches cont Sumatriptan 50 mg PRN -renewed today  Labs ordered pt to follow-up in 1 week for results.

## 2024-04-26 NOTE — HEALTH RISK ASSESSMENT
[Poor] : ~his/her~ current health as poor [Excellent] : ~his/her~  mood as  excellent [No] : No [0] : 2) Feeling down, depressed, or hopeless: Not at all (0) [PHQ-2 Negative - No further assessment needed] : PHQ-2 Negative - No further assessment needed [Patient reported mammogram was normal] : Patient reported mammogram was normal [Patient reported PAP Smear was normal] : Patient reported PAP Smear was normal [Patient reported bone density results were abnormal] : Patient reported bone density results were abnormal [Patient reported colonoscopy was abnormal] : Patient reported colonoscopy was abnormal [With Family] : lives with family [# of Members in Household ___] :  household currently consist of [unfilled] member(s) [High School] : high school [Employed] : employed [] :  [# Of Children ___] : has [unfilled] children [Feels Safe at Home] : Feels safe at home [Never] : Never [Good] : ~his/her~ current health as good [Little interest or pleasure doing things] : 1) Little interest or pleasure doing things [Feeling down, depressed, or hopeless] : 2) Feeling down, depressed, or hopeless [de-identified] : Maintains active by walking at work.  [de-identified] : Admits her diet could be better.  [ZLQ2Pppgh] : 0 [MammogramDate] : 05/2023 [PapSmearDate] : 2023 [BoneDensityDate] : 05/2023 [BoneDensityComments] : Osteopenia.  [ColonoscopyDate] : 02/2020 [ColonoscopyComments] : Perianal skin tags, non-bleeding internal hemorrhoids.  Was told to return in 5-10 years.

## 2024-04-26 NOTE — HISTORY OF PRESENT ILLNESS
[de-identified] : Ms. KAMARI BARROSO is a 53-year-old female with hx. of dependent edema, lumbar DJD, HTN, elevated LISA, HLD, migraines, and scoliosis, who presents for an annual physical exam, Rx refills.   Pt. states that on 03/22/2024 she went to urgent care d/t dark colored urine, malodourous, blood visualized in toilet. A urine dipstick was negative, and a lab UA was also negative. Pt reports Sx have cleared up since.  C/o of vaginal pain, pt. has been told by her GYN that she has a dropped bladder. Denies any Hx of kidney stones. Has an appt. with GYN on 04/01/2024, and an linda't with a urologist later this month.

## 2024-04-26 NOTE — REVIEW OF SYSTEMS
[Lower Ext Edema] : lower extremity edema [Negative] : Heme/Lymph [FreeTextEntry5] : chronic LE edema [FreeTextEntry8] : See HPI

## 2024-04-26 NOTE — PHYSICAL EXAM
[No Acute Distress] : no acute distress [Well Nourished] : well nourished [Well Developed] : well developed [Well-Appearing] : well-appearing [Normal Sclera/Conjunctiva] : normal sclera/conjunctiva [EOMI] : extraocular movements intact [Normal Outer Ear/Nose] : the outer ears and nose were normal in appearance [Normal Oropharynx] : the oropharynx was normal [Normal TMs] : both tympanic membranes were normal [No JVD] : no jugular venous distention [No Lymphadenopathy] : no lymphadenopathy [Supple] : supple [Thyroid Normal, No Nodules] : the thyroid was normal and there were no nodules present [No Respiratory Distress] : no respiratory distress  [No Accessory Muscle Use] : no accessory muscle use [Clear to Auscultation] : lungs were clear to auscultation bilaterally [Normal Rate] : normal rate  [Regular Rhythm] : with a regular rhythm [Normal S1, S2] : normal S1 and S2 [No Murmur] : no murmur heard [Pedal Pulses Present] : the pedal pulses are present [No Edema] : there was no peripheral edema [Soft] : abdomen soft [Non Tender] : non-tender [Non-distended] : non-distended [No Masses] : no abdominal mass palpated [No HSM] : no HSM [Normal Bowel Sounds] : normal bowel sounds [Normal Posterior Cervical Nodes] : no posterior cervical lymphadenopathy [Normal Anterior Cervical Nodes] : no anterior cervical lymphadenopathy [No CVA Tenderness] : no CVA  tenderness [No Spinal Tenderness] : no spinal tenderness [No Joint Swelling] : no joint swelling [Grossly Normal Strength/Tone] : grossly normal strength/tone [No Rash] : no rash [Coordination Grossly Intact] : coordination grossly intact [No Focal Deficits] : no focal deficits [Normal Gait] : normal gait [Normal Affect] : the affect was normal [Normal Insight/Judgement] : insight and judgment were intact [No Extremity Clubbing/Cyanosis] : no extremity clubbing/cyanosis [de-identified] : chronic non pitting edema

## 2024-04-26 NOTE — ADDENDUM
[FreeTextEntry1] : I, Dariela Landis, am scribing for and in the presence of Dr. Xiomara Elliott, DO in the following sections HISTORY OF PRESENT ILLNESS, PAST MEDICAL/FAMILY/SOCIAL HISTORY; REVIEW OF SYSTEMS; VITAL SIGNS; PHYSICAL EXAM; ASSESSMENT/PLAN on  03/27/2024.  I personally performed the services described in the documentation, reviewed the documentation recorded by the scribe in my presence, and it accurately and completely records my words and actions.

## 2024-05-01 ENCOUNTER — APPOINTMENT (OUTPATIENT)
Dept: PODIATRY | Facility: CLINIC | Age: 54
End: 2024-05-01
Payer: COMMERCIAL

## 2024-05-01 DIAGNOSIS — M77.8 OTHER ENTHESOPATHIES, NOT ELSEWHERE CLASSIFIED: ICD-10-CM

## 2024-05-01 DIAGNOSIS — M19.079 PRIMARY OSTEOARTHRITIS, UNSPECIFIED ANKLE AND FOOT: ICD-10-CM

## 2024-05-01 DIAGNOSIS — M25.579 PAIN IN UNSPECIFIED ANKLE AND JOINTS OF UNSPECIFIED FOOT: ICD-10-CM

## 2024-05-01 DIAGNOSIS — I89.0 LYMPHEDEMA, NOT ELSEWHERE CLASSIFIED: ICD-10-CM

## 2024-05-01 DIAGNOSIS — M89.8X7 OTHER SPECIFIED DISORDERS OF BONE, ANKLE AND FOOT: ICD-10-CM

## 2024-05-01 PROCEDURE — 73630 X-RAY EXAM OF FOOT: CPT | Mod: LT

## 2024-05-01 PROCEDURE — 99203 OFFICE O/P NEW LOW 30 MIN: CPT

## 2024-05-02 LAB
CRP SERPL-MCNC: <3 MG/L
ERYTHROCYTE [SEDIMENTATION RATE] IN BLOOD BY WESTERGREN METHOD: 17 MM/HR
RHEUMATOID FACT SER QL: 11 IU/ML
URATE SERPL-MCNC: 4.4 MG/DL

## 2024-05-02 NOTE — PHYSICAL EXAM
[Ankle Swelling (On Exam)] : present [Ankle Swelling Bilaterally] : bilaterally  [Ankle Swelling On The Left] : moderate [Varicose Veins Of Lower Extremities] : not present [Delayed in the Right Toes] : capillary refills normal in right toes [Delayed in the Left Toes] : capillary refills normal in the left toes [2+] : left foot dorsalis pedis 2+ [Skin Color & Pigmentation] : normal skin color and pigmentation [Skin Turgor] : normal skin turgor [] : no rash [Skin Lesions] : no skin lesions [Foot Ulcer] : no foot ulcer [Skin Induration] : no skin induration [Sensation] : the sensory exam was normal to light touch and pinprick [No Focal Deficits] : no focal deficits [Deep Tendon Reflexes (DTR)] : deep tendon reflexes were 2+ and symmetric [Motor Exam] : the motor exam was normal [General Appearance - Alert] : alert [Oriented To Time, Place, And Person] : oriented to person, place, and time

## 2024-05-06 LAB
ANA PAT FLD IF-IMP: ABNORMAL
ANA SER IF-ACNC: ABNORMAL
CCP AB SER IA-ACNC: <8 UNITS
RF+CCP IGG SER-IMP: NEGATIVE

## 2024-05-07 PROBLEM — N81.89 PELVIC FLOOR RELAXATION: Status: ACTIVE | Noted: 2024-05-07

## 2024-05-07 NOTE — HISTORY OF PRESENT ILLNESS
[FreeTextEntry1] : This pt is a pleasant 53 year old female who presents for evaluation of pelvic relaxation. She thinks that her bladder might have dropped.

## 2024-05-07 NOTE — PLAN
[FreeTextEntry1] : Pt given referral for mammogram and breast ultrasound in preparation for upcoming annual exam.

## 2024-05-07 NOTE — PHYSICAL EXAM
[Labia Majora] : normal [Labia Minora] : normal [Cystocele] : a cystocele [Normal] : normal [FreeTextEntry4] : minimal cystocoele

## 2024-05-07 NOTE — ASSESSMENT
[TextEntry] : Pleasant 53 year old female with concerns re bladder prolapse. Minimal cystocoele noted.

## 2024-06-10 ENCOUNTER — APPOINTMENT (OUTPATIENT)
Dept: CARDIOLOGY | Facility: CLINIC | Age: 54
End: 2024-06-10
Payer: COMMERCIAL

## 2024-06-10 VITALS
BODY MASS INDEX: 45.27 KG/M2 | SYSTOLIC BLOOD PRESSURE: 143 MMHG | WEIGHT: 246 LBS | DIASTOLIC BLOOD PRESSURE: 84 MMHG | RESPIRATION RATE: 14 BRPM | HEART RATE: 78 BPM | OXYGEN SATURATION: 98 % | HEIGHT: 62 IN | TEMPERATURE: 97.3 F

## 2024-06-10 DIAGNOSIS — R60.9 EDEMA, UNSPECIFIED: ICD-10-CM

## 2024-06-10 PROCEDURE — 99204 OFFICE O/P NEW MOD 45 MIN: CPT

## 2024-06-10 NOTE — ASSESSMENT
[FreeTextEntry1] : Assessment: 1.  Lipidema  Plan: 1.  Patient has the appearance of lipoedema on exam, with edema that is non-pitting, ankle cutoff sign and no stigmata of venous insufficiency or lymphedema 2.  The mainstay treatment of lipedema is avoidance of weight gain.  This is a chronic condition that causes buildup of adipose tissue in the legs and arms.   3.  Focus should be on diet and exercise 4.  Compression garments for symptomatic relief.   5. She can try a pneumatic pump as needed for relief of symptoms

## 2024-06-10 NOTE — PHYSICAL EXAM
[General Appearance - Well Developed] : well developed [Normal Appearance] : normal appearance [Well Groomed] : well groomed [General Appearance - Well Nourished] : well nourished [No Deformities] : no deformities [General Appearance - In No Acute Distress] : no acute distress [Normal Conjunctiva] : the conjunctiva exhibited no abnormalities [Eyelids - No Xanthelasma] : the eyelids demonstrated no xanthelasmas [Heart Rate And Rhythm] : heart rate and rhythm were normal [Heart Sounds] : normal S1 and S2 [Murmurs] : no murmurs present [Respiration, Rhythm And Depth] : normal respiratory rhythm and effort [Exaggerated Use Of Accessory Muscles For Inspiration] : no accessory muscle use [Auscultation Breath Sounds / Voice Sounds] : lungs were clear to auscultation bilaterally [Abdomen Soft] : soft [Abdomen Tenderness] : non-tender [] : no hepato-splenomegaly [Abdomen Mass (___ Cm)] : no abdominal mass palpated [Abnormal Walk] : normal gait [Gait - Sufficient For Exercise Testing] : the gait was sufficient for exercise testing [Oriented To Time, Place, And Person] : oriented to person, place, and time [Affect] : the affect was normal [Mood] : the mood was normal [No Anxiety] : not feeling anxious [FreeTextEntry1] : ankle cutoff sign.  strong pedal pulses.  scattered reticular veins.  Trace pitting edema ankles.  Otherwise most of the leg is non-pitting adipose.

## 2024-06-10 NOTE — REASON FOR VISIT
[Follow-Up - Clinic] : a clinic follow-up of [FreeTextEntry2] : Lipedema [FreeTextEntry1] : 6/10/2024  Last seen in 2020. Since last visit patient reports has gained weight lipiedema on exam legs full and achy by the end of the day   2020 49F here for second opinion for leg swelling Progressive through the day Has gained weight.  Trying weight watchers. entire leg affected, bilaterally No improvement with diuretic  When she gains weight its throughout the body, when loses rudy is upper body. No ulceration Legs feel tight and heavy.  Not wearing compression Duplex showed no DVT or reflux  No EOTH Limits salt intake not exercising

## 2024-06-14 ENCOUNTER — APPOINTMENT (OUTPATIENT)
Dept: RHEUMATOLOGY | Facility: CLINIC | Age: 54
End: 2024-06-14
Payer: COMMERCIAL

## 2024-06-14 VITALS
HEIGHT: 62 IN | WEIGHT: 246 LBS | RESPIRATION RATE: 19 BRPM | DIASTOLIC BLOOD PRESSURE: 86 MMHG | HEART RATE: 82 BPM | SYSTOLIC BLOOD PRESSURE: 146 MMHG | OXYGEN SATURATION: 97 % | BODY MASS INDEX: 45.27 KG/M2

## 2024-06-14 DIAGNOSIS — R76.8 OTHER SPECIFIED ABNORMAL IMMUNOLOGICAL FINDINGS IN SERUM: ICD-10-CM

## 2024-06-14 PROCEDURE — 99204 OFFICE O/P NEW MOD 45 MIN: CPT

## 2024-06-14 NOTE — ASSESSMENT
[FreeTextEntry1] : 54 y/o F w positive centromere =labs 1:160 w LISA centromere pattern =no other significant symptoms of systemic sclerosis *************************************************************** =hx of preeclampsia  Pt w centromere positivity, which is specific for systemic sclerosis. Counseled on diagnosis and prognosis of systemic sclerosis. Counseled that dz involves Raynauds, skin sclerosis. Explained that often dz goes beyond to affect lungs, causing inflammation damage, pulm HTN, GI issues which can severe. Also patient should avoid high dose steroids, as higher risk of scleroderma renal crisis, which can lead to severe kidney damage, and possibly death. Often patients w systemic sclerosis have shorter life span.  Explained that systemic sclerosis is managed depending on manifestations.  Pt however does not have clinical presentation of systemic sclerosis, therefore, does not have systemic sclerosis, despite ab positivity. Pt is at higher risk of developing this condition in future compared to those who do not have antibody.   Counseled to return to office if she develops worsening GERD, SOB, cough, skin changes like tightening, worsening Raynaud;s. Pt understood.   Counseled that preeclampsia can be associated w APLS, but pt decline further testing.   Plan No further testing RTO PRN

## 2024-06-14 NOTE — CONSULT LETTER
[Dear  ___] : Dear  [unfilled], [Consult Letter:] : I had the pleasure of evaluating your patient, [unfilled]. [Please see my note below.] : Please see my note below. [Consult Closing:] : Thank you very much for allowing me to participate in the care of this patient.  If you have any questions, please do not hesitate to contact me. [Sincerely,] : Sincerely, [FreeTextEntry3] : Heber Kwok MD

## 2024-06-14 NOTE — PHYSICAL EXAM
[Auscultation Breath Sounds / Voice Sounds] : lungs were clear to auscultation bilaterally [Heart Rate And Rhythm] : heart rate was normal and rhythm regular [Heart Sounds] : normal S1 and S2 [Murmurs] : no murmurs [Abdomen Soft] : soft [Abdomen Tenderness] : non-tender [Cervical Lymph Nodes Enlarged Posterior Bilaterally] : posterior cervical [Cervical Lymph Nodes Enlarged Anterior Bilaterally] : anterior cervical [Supraclavicular Lymph Nodes Enlarged Bilaterally] : supraclavicular [Musculoskeletal - Swelling] : no joint swelling seen [] : no rash [Skin Lesions] : no skin lesions [Oriented To Time, Place, And Person] : oriented to person, place, and time [FreeTextEntry1] : no skin sclerosis

## 2024-06-14 NOTE — HISTORY OF PRESENT ILLNESS
[FreeTextEntry1] : 54 y/o F here for consultation   Pt referred for positive LISA  No fevers, h/a, rashes, hair loss, oral ulcers, epistaxis, sinusitis,  swollen glands, dry mouth, dry eyes, CP, SOB, cough, vision changes, abdominal pain, GERD, n/v/d, blood in stool or urine, focal weakness, sensory loss,  Raynaud's, joint pain, swelling, weight loss.  +occasional dry eyes  +past GERD but pt controlled w diet; has not needed PPI in a while   hx of lipedema, has persistent leg swelling  L hip possible osteoarthritis, on note review trochanteric bursitis  Reports problems w spine since childhood.   OB: no hx of miscarriages; hx of preeclampsia

## 2024-06-21 ENCOUNTER — APPOINTMENT (OUTPATIENT)
Dept: OBGYN | Facility: CLINIC | Age: 54
End: 2024-06-21
Payer: COMMERCIAL

## 2024-06-21 ENCOUNTER — NON-APPOINTMENT (OUTPATIENT)
Age: 54
End: 2024-06-21

## 2024-06-21 VITALS
BODY MASS INDEX: 45.27 KG/M2 | SYSTOLIC BLOOD PRESSURE: 141 MMHG | WEIGHT: 246 LBS | DIASTOLIC BLOOD PRESSURE: 84 MMHG | HEIGHT: 62 IN | HEART RATE: 73 BPM

## 2024-06-21 DIAGNOSIS — Z01.419 ENCOUNTER FOR GYNECOLOGICAL EXAMINATION (GENERAL) (ROUTINE) W/OUT ABNORMAL FINDINGS: ICD-10-CM

## 2024-06-21 DIAGNOSIS — Z86.39 PERSONAL HISTORY OF OTHER ENDOCRINE, NUTRITIONAL AND METABOLIC DISEASE: ICD-10-CM

## 2024-06-21 DIAGNOSIS — Z87.898 PERSONAL HISTORY OF OTHER SPECIFIED CONDITIONS: ICD-10-CM

## 2024-06-21 PROCEDURE — 99396 PREV VISIT EST AGE 40-64: CPT

## 2024-06-21 RX ORDER — SEMAGLUTIDE 0.68 MG/ML
2 INJECTION, SOLUTION SUBCUTANEOUS
Qty: 3 | Refills: 0 | Status: DISCONTINUED | COMMUNITY
Start: 2024-04-22 | End: 2024-06-21

## 2024-06-24 RX ORDER — SYRINGE, DISPOSABLE, 3 ML
SYRINGE, EMPTY DISPOSABLE MISCELLANEOUS
Qty: 1 | Refills: 0 | Status: ACTIVE | COMMUNITY
Start: 2024-06-24 | End: 1900-01-01

## 2024-06-24 NOTE — HISTORY OF PRESENT ILLNESS
[FreeTextEntry1] : This 53 year old P2  LMP- age 48 years, presents for annual. Denies PMB, vaginal irritation or change in discharge, SA with , no change to voiding or stooling pattern- was w/u for transient hematuria that resolved; very frustrated with inability to lose weight, asking for a resource; no change to surgical or family Hx. [Mammogramdate] : May 2024 [TextBox_19] : BIRAD 2 [Papeardate] : Feb 2023 [TextBox_31] : neg pap and HPV [BoneDensityDate] : May 2023 [TextBox_37] : osteopenia [ColonoscopyDate] : Feb 2020 [TextBox_43] : repeat 5-10 years

## 2024-06-24 NOTE — PHYSICAL EXAM
[Chaperone Present] : A chaperone was present in the examining room during all aspects of the physical examination [Appropriately responsive] : appropriately responsive [Alert] : alert [No Acute Distress] : no acute distress [Soft] : soft [Non-tender] : non-tender [No Lesions] : no lesions [Oriented x3] : oriented x3 [Examination Of The Breasts] : a normal appearance [No Discharge] : no discharge [No Masses] : no breast masses were palpable [Labia Majora] : normal [Labia Minora] : normal [No Bleeding] : There was no active vaginal bleeding [Normal] : normal [Uterine Adnexae] : non-palpable [FreeTextEntry7] : rotund [Tenderness] : nontender

## 2024-06-24 NOTE — PLAN
[FreeTextEntry1] : Resource for [facility] [employer] office in hand Lifestyle Medicine handouts on [facility] diet provided Wily exercise sheet in hand RTO prn or for annual

## 2024-06-25 LAB — HPV HIGH+LOW RISK DNA PNL CVX: NOT DETECTED

## 2024-06-27 LAB — CYTOLOGY CVX/VAG DOC THIN PREP: NORMAL

## 2024-08-13 ENCOUNTER — APPOINTMENT (OUTPATIENT)
Dept: SURGERY | Facility: CLINIC | Age: 54
End: 2024-08-13

## 2024-09-30 ENCOUNTER — TRANSCRIPTION ENCOUNTER (OUTPATIENT)
Age: 54
End: 2024-09-30

## 2024-10-01 ENCOUNTER — APPOINTMENT (OUTPATIENT)
Dept: CARDIOLOGY | Facility: CLINIC | Age: 54
End: 2024-10-01

## 2024-10-05 ENCOUNTER — APPOINTMENT (OUTPATIENT)
Dept: OPHTHALMOLOGY | Facility: CLINIC | Age: 54
End: 2024-10-05

## 2024-11-06 ENCOUNTER — APPOINTMENT (OUTPATIENT)
Dept: INTERNAL MEDICINE | Facility: CLINIC | Age: 54
End: 2024-11-06
Payer: COMMERCIAL

## 2024-11-06 VITALS
SYSTOLIC BLOOD PRESSURE: 136 MMHG | RESPIRATION RATE: 17 BRPM | OXYGEN SATURATION: 98 % | WEIGHT: 218 LBS | BODY MASS INDEX: 40.12 KG/M2 | HEART RATE: 63 BPM | DIASTOLIC BLOOD PRESSURE: 83 MMHG | TEMPERATURE: 97.9 F | HEIGHT: 62 IN

## 2024-11-06 DIAGNOSIS — E78.5 HYPERLIPIDEMIA, UNSPECIFIED: ICD-10-CM

## 2024-11-06 DIAGNOSIS — Z23 ENCOUNTER FOR IMMUNIZATION: ICD-10-CM

## 2024-11-06 DIAGNOSIS — I10 ESSENTIAL (PRIMARY) HYPERTENSION: ICD-10-CM

## 2024-11-06 PROCEDURE — 90471 IMMUNIZATION ADMIN: CPT

## 2024-11-06 PROCEDURE — 99214 OFFICE O/P EST MOD 30 MIN: CPT | Mod: 25

## 2024-11-06 PROCEDURE — 90673 RIV3 VACCINE NO PRESERV IM: CPT

## 2024-11-06 NOTE — HISTORY OF PRESENT ILLNESS
[de-identified] : Ms. IOANA PEREZ is a 53 year old female with Hx of dependent edema, lumbar DJD, HTN, elevated YOON, HLD, migraines, and scoliosis, who presents for a follow up visit, Rx refills.  Pt states she is feeling well. Offers no complaints. Denies any SOB, CP, abdominal pain, N/V/D, dizziness. Reports compliance with taking her meds daily. Followed up with rheumatology given Hx of positive YOON. As per pt, everything was normal.

## 2024-11-06 NOTE — ASSESSMENT
[FreeTextEntry1] : Follow up  Obesity cont low sugar/low carb diet  Hx of dropped bladder following with urology  HTN cont Losartan 100 mg daily -renewed today cont Lasix 20 mg daily -renewed today reinforced importance of following a low salt diet, weight loss, exercise  HLD cont Lipitor 10mg daily Reinforced the importance of following a low fat/low cholesterol diet, exercise We'll check Lipid panel and LFTs today.  History of Migraine headaches cont Sumatriptan 50 mg PRN -renewed today  flu vaccine administered today  Blood work ordered. Follow up in one week for lab results

## 2024-11-06 NOTE — ADDENDUM
[FreeTextEntry1] : Documented by Laina Multani acting as a scribe for Dr. Kait Matute. 11/06/2024   All medical record entries made by the scribe were at my, Dr. Kait Matute, direction and personally dictated by me on 11/06/2024. I have reviewed the chart and agree that the record accurately reflects my personal performance of the history, physical exam, assessment and plan. I have also personally directed, reviewed, and agreed with the chart.

## 2024-11-06 NOTE — PHYSICAL EXAM
[No Acute Distress] : no acute distress [Well-Appearing] : well-appearing [Normal Sclera/Conjunctiva] : normal sclera/conjunctiva [Normal Outer Ear/Nose] : the outer ears and nose were normal in appearance [No JVD] : no jugular venous distention [No Lymphadenopathy] : no lymphadenopathy [Supple] : supple [No Respiratory Distress] : no respiratory distress  [No Accessory Muscle Use] : no accessory muscle use [Clear to Auscultation] : lungs were clear to auscultation bilaterally [Normal Rate] : normal rate  [Regular Rhythm] : with a regular rhythm [Normal S1, S2] : normal S1 and S2 [No Murmur] : no murmur heard [Pedal Pulses Present] : the pedal pulses are present [No Extremity Clubbing/Cyanosis] : no extremity clubbing/cyanosis [Soft] : abdomen soft [Non Tender] : non-tender [Non-distended] : non-distended [Normal Posterior Cervical Nodes] : no posterior cervical lymphadenopathy [Normal Anterior Cervical Nodes] : no anterior cervical lymphadenopathy [No CVA Tenderness] : no CVA  tenderness [No Spinal Tenderness] : no spinal tenderness [No Joint Swelling] : no joint swelling [Grossly Normal Strength/Tone] : grossly normal strength/tone [No Rash] : no rash [Coordination Grossly Intact] : coordination grossly intact [No Focal Deficits] : no focal deficits [Normal Gait] : normal gait [Normal Affect] : the affect was normal [Normal Insight/Judgement] : insight and judgment were intact [de-identified] : + chronic non pitting edema, improved

## 2024-11-15 LAB
ALBUMIN SERPL ELPH-MCNC: 4.5 G/DL
ALP BLD-CCNC: 62 U/L
ALT SERPL-CCNC: 17 U/L
ANION GAP SERPL CALC-SCNC: 14 MMOL/L
AST SERPL-CCNC: 15 U/L
BILIRUB SERPL-MCNC: 0.5 MG/DL
BUN SERPL-MCNC: 21 MG/DL
CALCIUM SERPL-MCNC: 9.7 MG/DL
CHLORIDE SERPL-SCNC: 101 MMOL/L
CHOLEST SERPL-MCNC: 176 MG/DL
CO2 SERPL-SCNC: 26 MMOL/L
CREAT SERPL-MCNC: 0.71 MG/DL
EGFR: 101 ML/MIN/1.73M2
ESTIMATED AVERAGE GLUCOSE: 91 MG/DL
GLUCOSE SERPL-MCNC: 83 MG/DL
HBA1C MFR BLD HPLC: 4.8 %
HDLC SERPL-MCNC: 64 MG/DL
LDLC SERPL CALC-MCNC: 100 MG/DL
NONHDLC SERPL-MCNC: 112 MG/DL
POTASSIUM SERPL-SCNC: 4.4 MMOL/L
PROT SERPL-MCNC: 7.3 G/DL
SODIUM SERPL-SCNC: 141 MMOL/L
TRIGL SERPL-MCNC: 64 MG/DL

## 2025-02-26 ENCOUNTER — APPOINTMENT (OUTPATIENT)
Dept: BARIATRICS/WEIGHT MGMT | Facility: CLINIC | Age: 55
End: 2025-02-26

## 2025-03-03 ENCOUNTER — NON-APPOINTMENT (OUTPATIENT)
Age: 55
End: 2025-03-03

## 2025-04-21 ENCOUNTER — NON-APPOINTMENT (OUTPATIENT)
Age: 55
End: 2025-04-21

## 2025-06-18 ENCOUNTER — TRANSCRIPTION ENCOUNTER (OUTPATIENT)
Age: 55
End: 2025-06-18

## 2025-07-01 ENCOUNTER — APPOINTMENT (OUTPATIENT)
Dept: INTERNAL MEDICINE | Facility: CLINIC | Age: 55
End: 2025-07-01
Payer: COMMERCIAL

## 2025-07-01 ENCOUNTER — LABORATORY RESULT (OUTPATIENT)
Age: 55
End: 2025-07-01

## 2025-07-01 VITALS
DIASTOLIC BLOOD PRESSURE: 86 MMHG | TEMPERATURE: 97.3 F | BODY MASS INDEX: 36.62 KG/M2 | WEIGHT: 199 LBS | HEIGHT: 62 IN | RESPIRATION RATE: 17 BRPM | HEART RATE: 61 BPM | OXYGEN SATURATION: 99 % | SYSTOLIC BLOOD PRESSURE: 138 MMHG

## 2025-07-01 PROBLEM — I78.1 SPIDER VEINS: Status: ACTIVE | Noted: 2025-07-01

## 2025-07-01 PROCEDURE — 99396 PREV VISIT EST AGE 40-64: CPT

## 2025-07-03 LAB
25(OH)D3 SERPL-MCNC: 36.2 NG/ML
ALBUMIN SERPL ELPH-MCNC: 4.5 G/DL
ALP BLD-CCNC: 51 U/L
ALT SERPL-CCNC: 24 U/L
ANION GAP SERPL CALC-SCNC: 14 MMOL/L
APPEARANCE: CLEAR
AST SERPL-CCNC: 15 U/L
BASOPHILS # BLD AUTO: 0.02 K/UL
BASOPHILS NFR BLD AUTO: 0.4 %
BILIRUB SERPL-MCNC: 0.7 MG/DL
BILIRUBIN URINE: NEGATIVE
BLOOD URINE: NEGATIVE
BUN SERPL-MCNC: 26 MG/DL
CALCIUM SERPL-MCNC: 9.6 MG/DL
CHLORIDE SERPL-SCNC: 102 MMOL/L
CHOLEST SERPL-MCNC: 155 MG/DL
CO2 SERPL-SCNC: 24 MMOL/L
COLOR: YELLOW
CREAT SERPL-MCNC: 0.7 MG/DL
EGFRCR SERPLBLD CKD-EPI 2021: 102 ML/MIN/1.73M2
EOSINOPHIL # BLD AUTO: 0.07 K/UL
EOSINOPHIL NFR BLD AUTO: 1.3 %
ESTIMATED AVERAGE GLUCOSE: 100 MG/DL
GLUCOSE QUALITATIVE U: NEGATIVE MG/DL
GLUCOSE SERPL-MCNC: 78 MG/DL
HBA1C MFR BLD HPLC: 5.1 %
HCT VFR BLD CALC: 43.7 %
HDLC SERPL-MCNC: 75 MG/DL
HGB BLD-MCNC: 14.3 G/DL
IMM GRANULOCYTES NFR BLD AUTO: 0.2 %
KETONES URINE: NEGATIVE MG/DL
LDLC SERPL-MCNC: 68 MG/DL
LEUKOCYTE ESTERASE URINE: ABNORMAL
LYMPHOCYTES # BLD AUTO: 1.74 K/UL
LYMPHOCYTES NFR BLD AUTO: 32.4 %
MAN DIFF?: NORMAL
MCHC RBC-ENTMCNC: 30.7 PG
MCHC RBC-ENTMCNC: 32.7 G/DL
MCV RBC AUTO: 93.8 FL
MONOCYTES # BLD AUTO: 0.43 K/UL
MONOCYTES NFR BLD AUTO: 8 %
NEUTROPHILS # BLD AUTO: 3.1 K/UL
NEUTROPHILS NFR BLD AUTO: 57.7 %
NITRITE URINE: NEGATIVE
NONHDLC SERPL-MCNC: 80 MG/DL
PH URINE: 6.5
PLATELET # BLD AUTO: 189 K/UL
POTASSIUM SERPL-SCNC: 4.1 MMOL/L
PROT SERPL-MCNC: 7 G/DL
PROTEIN URINE: NEGATIVE MG/DL
RBC # BLD: 4.66 M/UL
RBC # FLD: 13.5 %
SODIUM SERPL-SCNC: 140 MMOL/L
SPECIFIC GRAVITY URINE: 1.02
TRIGL SERPL-MCNC: 55 MG/DL
TSH SERPL-ACNC: 1.33 UIU/ML
UROBILINOGEN URINE: 0.2 MG/DL
VIT B12 SERPL-MCNC: 921 PG/ML
WBC # FLD AUTO: 5.37 K/UL

## 2025-07-18 ENCOUNTER — APPOINTMENT (OUTPATIENT)
Dept: OBGYN | Facility: CLINIC | Age: 55
End: 2025-07-18
Payer: COMMERCIAL

## 2025-07-18 VITALS
WEIGHT: 198 LBS | SYSTOLIC BLOOD PRESSURE: 123 MMHG | HEIGHT: 62 IN | HEART RATE: 71 BPM | BODY MASS INDEX: 36.44 KG/M2 | DIASTOLIC BLOOD PRESSURE: 83 MMHG

## 2025-07-18 PROBLEM — N89.8 VAGINAL DISCHARGE: Status: ACTIVE | Noted: 2025-07-18

## 2025-07-18 PROBLEM — Z01.419 WOMEN'S ANNUAL ROUTINE GYNECOLOGICAL EXAMINATION: Status: RESOLVED | Noted: 2020-07-06 | Resolved: 2025-07-18

## 2025-07-18 PROBLEM — Z01.419 WOMEN'S ANNUAL ROUTINE GYNECOLOGICAL EXAMINATION: Status: ACTIVE | Noted: 2025-07-18

## 2025-07-18 PROBLEM — Z78.0 MENOPAUSE: Status: ACTIVE | Noted: 2025-07-18

## 2025-07-18 PROBLEM — L53.9 ERYTHEMA OF VAGINA: Status: ACTIVE | Noted: 2025-07-18

## 2025-07-18 PROCEDURE — 99212 OFFICE O/P EST SF 10 MIN: CPT | Mod: 25

## 2025-07-18 PROCEDURE — 99396 PREV VISIT EST AGE 40-64: CPT

## 2025-07-18 PROCEDURE — 99459 PELVIC EXAMINATION: CPT

## 2025-07-20 PROBLEM — R32 URINARY INCONTINENCE, UNSPECIFIED TYPE: Status: ACTIVE | Noted: 2025-07-20

## 2025-07-23 LAB
BV BACTERIA RRNA VAG QL NAA+PROBE: NOT DETECTED
C GLABRATA RNA VAG QL NAA+PROBE: NOT DETECTED
CANDIDA RRNA VAG QL PROBE: NOT DETECTED
T VAGINALIS RRNA SPEC QL NAA+PROBE: NOT DETECTED

## 2025-07-30 ENCOUNTER — APPOINTMENT (OUTPATIENT)
Dept: ORTHOPEDIC SURGERY | Facility: CLINIC | Age: 55
End: 2025-07-30
Payer: COMMERCIAL

## 2025-07-30 VITALS
WEIGHT: 198 LBS | OXYGEN SATURATION: 99 % | HEIGHT: 62 IN | SYSTOLIC BLOOD PRESSURE: 126 MMHG | BODY MASS INDEX: 36.44 KG/M2 | TEMPERATURE: 97.8 F | RESPIRATION RATE: 16 BRPM | DIASTOLIC BLOOD PRESSURE: 83 MMHG | HEART RATE: 72 BPM

## 2025-07-30 DIAGNOSIS — M70.62 TROCHANTERIC BURSITIS, LEFT HIP: ICD-10-CM

## 2025-07-30 PROCEDURE — 99214 OFFICE O/P EST MOD 30 MIN: CPT | Mod: 25

## 2025-07-30 PROCEDURE — 20610 DRAIN/INJ JOINT/BURSA W/O US: CPT | Mod: LT

## 2025-07-30 PROCEDURE — 73522 X-RAY EXAM HIPS BI 3-4 VIEWS: CPT

## 2025-07-30 RX ORDER — DICLOFENAC SODIUM 50 MG/1
50 TABLET, DELAYED RELEASE ORAL TWICE DAILY
Qty: 60 | Refills: 0 | Status: ACTIVE | COMMUNITY
Start: 2025-07-30 | End: 1900-01-01

## 2025-08-04 ENCOUNTER — APPOINTMENT (OUTPATIENT)
Dept: CARDIOLOGY | Facility: CLINIC | Age: 55
End: 2025-08-04

## 2025-08-18 ENCOUNTER — APPOINTMENT (OUTPATIENT)
Dept: OBGYN | Facility: CLINIC | Age: 55
End: 2025-08-18
Payer: COMMERCIAL

## 2025-08-18 VITALS
HEIGHT: 62 IN | DIASTOLIC BLOOD PRESSURE: 84 MMHG | SYSTOLIC BLOOD PRESSURE: 128 MMHG | WEIGHT: 207 LBS | BODY MASS INDEX: 38.09 KG/M2 | HEART RATE: 65 BPM

## 2025-08-18 DIAGNOSIS — N81.11 CYSTOCELE, MIDLINE: ICD-10-CM

## 2025-08-18 DIAGNOSIS — N39.46 MIXED INCONTINENCE: ICD-10-CM

## 2025-08-18 DIAGNOSIS — N39.41 URGE INCONTINENCE: ICD-10-CM

## 2025-08-18 DIAGNOSIS — Z56.0 UNEMPLOYMENT, UNSPECIFIED: ICD-10-CM

## 2025-08-18 DIAGNOSIS — Z80.1 FAMILY HISTORY OF MALIGNANT NEOPLASM OF TRACHEA, BRONCHUS AND LUNG: ICD-10-CM

## 2025-08-18 DIAGNOSIS — N81.9 FEMALE GENITAL PROLAPSE, UNSPECIFIED: ICD-10-CM

## 2025-08-18 DIAGNOSIS — Z78.9 OTHER SPECIFIED HEALTH STATUS: ICD-10-CM

## 2025-08-18 DIAGNOSIS — N95.2 POSTMENOPAUSAL ATROPHIC VAGINITIS: ICD-10-CM

## 2025-08-18 DIAGNOSIS — N81.6 RECTOCELE: ICD-10-CM

## 2025-08-18 DIAGNOSIS — N32.81 OVERACTIVE BLADDER: ICD-10-CM

## 2025-08-18 LAB
BILIRUB UR QL STRIP: NORMAL
COLLECTION METHOD: NORMAL
GLUCOSE UR-MCNC: NORMAL
HCG UR QL: 0.2 EU/DL
HGB UR QL STRIP.AUTO: NORMAL
KETONES UR-MCNC: NORMAL
LEUKOCYTE ESTERASE UR QL STRIP: NORMAL
NITRITE UR QL STRIP: NORMAL
PH UR STRIP: 6
PROT UR STRIP-MCNC: NORMAL
SP GR UR STRIP: 1.02

## 2025-08-18 PROCEDURE — 99214 OFFICE O/P EST MOD 30 MIN: CPT | Mod: 25

## 2025-08-18 PROCEDURE — 99459 PELVIC EXAMINATION: CPT

## 2025-08-18 PROCEDURE — 51798 US URINE CAPACITY MEASURE: CPT

## 2025-08-18 SDOH — ECONOMIC STABILITY - INCOME SECURITY: UNEMPLOYMENT, UNSPECIFIED: Z56.0
